# Patient Record
Sex: MALE | Race: WHITE | NOT HISPANIC OR LATINO | Employment: FULL TIME | URBAN - METROPOLITAN AREA
[De-identification: names, ages, dates, MRNs, and addresses within clinical notes are randomized per-mention and may not be internally consistent; named-entity substitution may affect disease eponyms.]

---

## 2018-05-02 ENCOUNTER — OFFICE VISIT (OUTPATIENT)
Dept: FAMILY MEDICINE | Facility: CLINIC | Age: 48
End: 2018-05-02
Payer: COMMERCIAL

## 2018-05-02 VITALS
HEIGHT: 71 IN | WEIGHT: 160.1 LBS | TEMPERATURE: 98.1 F | BODY MASS INDEX: 22.41 KG/M2 | DIASTOLIC BLOOD PRESSURE: 70 MMHG | OXYGEN SATURATION: 99 % | RESPIRATION RATE: 16 BRPM | HEART RATE: 75 BPM | SYSTOLIC BLOOD PRESSURE: 112 MMHG

## 2018-05-02 DIAGNOSIS — Z12.5 SCREENING FOR PROSTATE CANCER: ICD-10-CM

## 2018-05-02 DIAGNOSIS — Z00.00 ENCOUNTER FOR ROUTINE ADULT HEALTH EXAMINATION WITHOUT ABNORMAL FINDINGS: Primary | ICD-10-CM

## 2018-05-02 DIAGNOSIS — E78.5 HYPERLIPIDEMIA LDL GOAL <160: ICD-10-CM

## 2018-05-02 DIAGNOSIS — Z80.42 FAMILY HISTORY OF MALIGNANT NEOPLASM OF PROSTATE: ICD-10-CM

## 2018-05-02 DIAGNOSIS — L21.9 SEBORRHEIC DERMATITIS: ICD-10-CM

## 2018-05-02 DIAGNOSIS — Z12.11 COLON CANCER SCREENING: ICD-10-CM

## 2018-05-02 DIAGNOSIS — Z11.3 SCREEN FOR STD (SEXUALLY TRANSMITTED DISEASE): ICD-10-CM

## 2018-05-02 DIAGNOSIS — Z23 NEED FOR TDAP VACCINATION: ICD-10-CM

## 2018-05-02 DIAGNOSIS — B00.9 HERPES SIMPLEX VIRUS (HSV) INFECTION: ICD-10-CM

## 2018-05-02 DIAGNOSIS — G47.09 OTHER INSOMNIA: ICD-10-CM

## 2018-05-02 LAB
CHOLEST SERPL-MCNC: 229 MG/DL
GLUCOSE SERPL-MCNC: 87 MG/DL (ref 70–99)
HDLC SERPL-MCNC: 75 MG/DL
HIV 1+2 AB+HIV1 P24 AG SERPL QL IA: NONREACTIVE
LDLC SERPL CALC-MCNC: 144 MG/DL
NONHDLC SERPL-MCNC: 154 MG/DL
PSA SERPL-ACNC: 1.06 UG/L (ref 0–4)
TRIGL SERPL-MCNC: 48 MG/DL

## 2018-05-02 PROCEDURE — 36415 COLL VENOUS BLD VENIPUNCTURE: CPT | Performed by: FAMILY MEDICINE

## 2018-05-02 PROCEDURE — 90715 TDAP VACCINE 7 YRS/> IM: CPT | Performed by: FAMILY MEDICINE

## 2018-05-02 PROCEDURE — 87591 N.GONORRHOEAE DNA AMP PROB: CPT | Performed by: FAMILY MEDICINE

## 2018-05-02 PROCEDURE — 80061 LIPID PANEL: CPT | Performed by: FAMILY MEDICINE

## 2018-05-02 PROCEDURE — 86780 TREPONEMA PALLIDUM: CPT | Performed by: FAMILY MEDICINE

## 2018-05-02 PROCEDURE — 87491 CHLMYD TRACH DNA AMP PROBE: CPT | Performed by: FAMILY MEDICINE

## 2018-05-02 PROCEDURE — 87389 HIV-1 AG W/HIV-1&-2 AB AG IA: CPT | Performed by: FAMILY MEDICINE

## 2018-05-02 PROCEDURE — 99396 PREV VISIT EST AGE 40-64: CPT | Mod: 25 | Performed by: FAMILY MEDICINE

## 2018-05-02 PROCEDURE — 90471 IMMUNIZATION ADMIN: CPT | Performed by: FAMILY MEDICINE

## 2018-05-02 PROCEDURE — 82947 ASSAY GLUCOSE BLOOD QUANT: CPT | Performed by: FAMILY MEDICINE

## 2018-05-02 PROCEDURE — G0103 PSA SCREENING: HCPCS | Performed by: FAMILY MEDICINE

## 2018-05-02 NOTE — MR AVS SNAPSHOT
After Visit Summary   5/2/2018    Reji Lamb    MRN: [id number]           Patient Information     Date Of Birth          1970        Visit Information        Provider Department      5/2/2018 10:00 AM Veronica Sampson MD [facility]        Today's Diagnoses     Encounter for routine adult health examination without abnormal findings    -  1    Colon cancer screening        Need for Tdap vaccination        Hyperlipidemia LDL goal <160        Herpes simplex virus (HSV) infection        Other insomnia        Screen for STD (sexually transmitted disease)        Seborrheic dermatitis        Family history of malignant neoplasm of prostate        Screening for prostate cancer          Care Instructions      Preventive Health Recommendations  Male Ages 50 - 64    Yearly exam:             See your health care provider every year in order to  o   Review health changes.   o   Discuss preventive care.    o   Review your medicines if your doctor has prescribed any.     Have a cholesterol test every 5 years, or more frequently if you are at risk for high cholesterol/heart disease.     Have a diabetes test (fasting glucose) every three years. If you are at risk for diabetes, you should have this test more often.     Have a colonoscopy at age 50, or have a yearly FIT test (stool test). These exams will check for colon cancer.      Talk with your health care provider about whether or not a prostate cancer screening test (PSA) is right for you.    You should be tested each year for STDs (sexually transmitted diseases), if you re at risk.     Shots: Get a flu shot each year. Get a tetanus shot every 10 years.     Nutrition:    Eat at least 5 servings of fruits and vegetables daily.     Eat whole-grain bread, whole-wheat pasta and brown rice instead of white grains and rice.     Talk to your provider about Calcium and Vitamin D.     Lifestyle    Exercise for at least 150 minutes a week (30  minutes a day, 5 days a week). This will help you control your weight and prevent disease.     Limit alcohol to one drink per day.     No smoking.     Wear sunscreen to prevent skin cancer.     See your dentist every six months for an exam and cleaning.     See your eye doctor every 1 to 2 years.            Follow-ups after your visit        Additional Services     GASTROENTEROLOGY ADULT REF PROCEDURE ONLY Olga Weathers [phone]; Dr. MARYLOU Hooker       Last Lab Result: Creatinine (mg/dL)       Date                     Value                 11/18/2008               0.86             ----------  Body mass index is 22.33 kg/(m^2).      Patient will be contacted to schedule procedure.     Please be aware that coverage of these services is subject to the terms and limitations of your health insurance plan.  Call member services at your health plan with any benefit or coverage questions.  Any procedures must be performed at a [facility] facility OR coordinated by your clinic's referral office.    Please bring the following with you to your appointment:    (1) Any X-Rays, CTs or MRIs which have been performed.  Contact the facility where they were done to arrange for  prior to your scheduled appointment.    (2) List of current medications   (3) This referral request   (4) Any documents/labs given to you for this referral                  Who to contact     If you have questions or need follow up information about today's clinic visit or your schedule please contact [facility] directly at [phone].  Normal or non-critical lab and imaging results will be communicated to you by MyChart, letter or phone within 4 business days after the clinic has received the results. If you do not hear from us within 7 days, please contact the clinic through MyChart or phone. If you have a critical or abnormal lab result, we will notify you by phone as soon as possible.  Submit refill requests through [employer] or  "call your pharmacy and they will forward the refill request to us. Please allow 3 business days for your refill to be completed.          Additional Information About Your Visit        sarvaMAILhart Information     [employer] gives you secure access to your electronic health record. If you see a primary care provider, you can also send messages to your care team and make appointments. If you have questions, please call your primary care clinic.  If you do not have a primary care provider, please call [phone] and they will assist you.        Care EveryWhere ID     This is your Care EveryWhere ID. This could be used by other organizations to access your [facility] medical records  [id number]        Your Vitals Were     Pulse Temperature Respirations Height Pulse Oximetry BMI (Body Mass Index)    75 98.1  F (36.7  C) (Oral) 16 5' 11\" (1.803 m) 99% 22.33 kg/m2       Blood Pressure from Last 3 Encounters:   05/02/18 112/70   05/11/16 110/65   05/20/15 116/72    Weight from Last 3 Encounters:   05/02/18 160 lb 1.6 oz (72.6 kg)   05/11/16 151 lb (68.5 kg)   05/20/15 151 lb 9.6 oz (68.8 kg)              We Performed the Following     CHLAMYDIA TRACHOMATIS PCR     GASTROENTEROLOGY ADULT REF PROCEDURE ONLY Olga Weathers [phone]; Dr. MARYLOU Hooker     Glucose     HIV Antigen Antibody Combo     Lipid panel reflex to direct LDL Fasting     NEISSERIA GONORRHOEA PCR     PSA, screen     TDAP VACCINE (ADACEL)     Treponema Abs w Reflex to RPR and Titer        Primary Care Provider Office Phone # Fax #    Veronica Sampson [phone] [address] [facility] [address] [address]        Equal Access to Services     [facility] : Hadii lincoln Gibbs, waaxda luqadaha, qaybta kaalmada bertram, jassi patel. So [facility] [phone].    ATENCIÓN: Si habla español, tiene a merlos disposición servicios gratuitos de asistencia lingüística. Llame al [phone].    We " comply with applicable federal civil rights laws and [place] laws. We do not discriminate on the basis of race, color, national origin, age, disability, sex, sexual orientation, or gender identity.            Thank you!     Thank you for choosing [facility]  for your care. Our goal is always to provide you with excellent care. Hearing back from our patients is one way we can continue to improve our services. Please take a few minutes to complete the written survey that you may receive in the mail after your visit with us. Thank you!             Your Updated Medication List - Protect others around you: Learn how to safely use, store and throw away your medicines at www.disposemymeds.org.          This list is accurate as of 5/2/18 10:55 AM.  Always use your most recent med list.                   Brand Name Dispense Instructions for use Diagnosis    traZODone 50 MG tablet    DESYREL    40 tablet    Take 1 tablet (50 mg) by mouth At Bedtime Titrate to best dose - from 50mg to 100mg per night.    Other insomnia       triamcinolone 0.1 % ointment    KENALOG    15 g    Apply sparingly to affected area two times daily as needed    Seborrheic dermatitis       valACYclovir 1000 mg tablet    VALTREX    16 tablet    Take 2 tablets (2,000 mg) by mouth 2 times daily    Herpes simplex without mention of complication       vitamin D 1000 units capsule      Take 1 capsule by mouth daily

## 2018-05-02 NOTE — NURSING NOTE
"Chief Complaint   Patient presents with     Physical     pt is fasting     /70  Pulse 75  Temp 98.1  F (36.7  C) (Oral)  Resp 16  Ht 5' 11\" (1.803 m)  Wt 160 lb 1.6 oz (72.6 kg)  SpO2 99%  BMI 22.33 kg/m2 Estimated body mass index is 22.33 kg/(m^2) as calculated from the following:    Height as of this encounter: 5' 11\" (1.803 m).    Weight as of this encounter: 160 lb 1.6 oz (72.6 kg).  Medication Reconciliation: complete      Health Maintenance due pending provider review:  Colonoscopy/FIT    MAMMO/COLON--Gave pt phone number, and pended order for Mammo and/or Colonoscopy    Jocelynn Dudley CMA  "

## 2018-05-02 NOTE — PROGRESS NOTES
SUBJECTIVE:   CC: Reji Lamb is an 48 year old male who presents for preventative health visit.     Physical   Annual:     Getting at least 3 servings of Calcium per day::  Yes    Bi-annual eye exam::  Yes    Dental care twice a year::  Yes    Sleep apnea or symptoms of sleep apnea::  None    Diet::  Regular (no restrictions)    Frequency of exercise::  6-7 days/week    Duration of exercise::  45-60 minutes    Taking medications regularly::  Yes    Medication side effects::  Not applicable    Additional concerns today::  No            Father  ~1 1/2 yrs ago- shortly after moving him up to [employer].  Night prior, wasn't feeling that great.    Brother and his three girls the night prior- picnic on his bed.  [place] was great.    Mother is down in [place], she's doing pretty well.    Insomnia- was able to stop the trazodone, and is doing well.  Freelancing for now, now looking for a new position.  Using unisom about once a month.  Rec trying melatonin for those nights.    Seb dermatitis- using coconut oil working well.    Herpes cold sores- no outbreaks in ~3 yrs, has a few tabs left at home if needed to start course.        Today's PHQ-2 Score:   PHQ-2 (  [employer]) 2018   Q1: Little interest or pleasure in doing things 0   Q2: Feeling down, depressed or hopeless 0   PHQ-2 Score 0   Q1: Little interest or pleasure in doing things Not at all   Q2: Feeling down, depressed or hopeless Not at all   PHQ-2 Score 0       Abuse: Current or Past(Physical, Sexual or Emotional)- NO  Do you feel safe in your environment - YES    Social History   Substance Use Topics     Smoking status: Never Smoker     Smokeless tobacco: Never Used     Alcohol use Yes      Comment: ~6 drinks/wk     Alcohol Use 2018   If you drink alcohol do you typically have greater than 3 drinks per day OR greater than 7 drinks per week? No   No flowsheet data found.    Last PSA: No results found for: PSA    Reviewed orders with  patient. Reviewed health maintenance and updated orders accordingly - Yes  Labs reviewed in EPIC  BP Readings from Last 3 Encounters:   05/02/18 112/70   05/11/16 110/65   05/20/15 116/72    Wt Readings from Last 3 Encounters:   05/02/18 160 lb 1.6 oz (72.6 kg)   05/11/16 151 lb (68.5 kg)   05/20/15 151 lb 9.6 oz (68.8 kg)                  Patient Active Problem List   Diagnosis     Family history of malignant neoplasm of prostate     HYPERLIPIDEMIA LDL GOAL <160     Herpes simplex virus (HSV) infection     Insomnia     Bilateral low back pain with sciatica, sciatica laterality unspecified     Past Surgical History:   Procedure Laterality Date     C NONSPECIFIC PROCEDURE  Age 3    Cystectomy from eyelid     HERNIA REPAIR, INGUINAL RT/LT  2004    bilateral R>L       Social History   Substance Use Topics     Smoking status: Never Smoker     Smokeless tobacco: Never Used     Alcohol use Yes      Comment: ~6 drinks/wk     Family History   Problem Relation Age of Onset     Hypertension Mother      Breast Cancer Mother 71     lumpectomy/radiation, dx at 71, another lumpectomy at 75     Prostate Cancer Father      Dx'd age 55-60 approx, did well after txt, no issues     CANCER Father 48     esophageal cancer, partner smoked, some etoh     Allergies Sister      Lipids Brother      on meds     Circulatory Brother      DVT on coumadin     C.A.D. No family hx of      CEREBROVASCULAR DISEASE No family hx of          Current Outpatient Prescriptions   Medication Sig Dispense Refill     Cholecalciferol (VITAMIN D) 1000 UNITS capsule Take 1 capsule by mouth daily       traZODone (DESYREL) 50 MG tablet Take 1 tablet (50 mg) by mouth At Bedtime Titrate to best dose - from 50mg to 100mg per night. (Patient not taking: Reported on 5/2/2018) 40 tablet 2     triamcinolone (KENALOG) 0.1 % ointment Apply sparingly to affected area two times daily as needed (Patient not taking: Reported on 5/2/2018) 15 g 1     valACYclovir (VALTREX) 1000  "mg tablet Take 2 tablets (2,000 mg) by mouth 2 times daily 16 tablet 1     Allergies   Allergen Reactions     Amoxicillin      Turned Red     Seasonal Allergies      Recent Labs   Lab Test  05/02/18   1128  05/11/16   0825  12/10/13   0811   04/21/11   0819   LDL  144*  114*  109   < >  147*   HDL  75  74  73   < >  56   TRIG  48  68  103   < >  62   ALT   --    --    --    --   24    < > = values in this interval not displayed.        Reviewed and updated as needed this visit by clinical staff  Tobacco  Allergies  Meds  Med Hx  Surg Hx  Fam Hx  Soc Hx        Reviewed and updated as needed this visit by Provider            Review of Systems  10 point ROS of systems including Constitutional, Eyes, Respiratory, Cardiovascular, Gastroenterology, Genitourinary, Integumentary, Muscularskeletal, Psychiatric were all negative except for pertinent positives noted in my HPI.      OBJECTIVE:   /70  Pulse 75  Temp 98.1  F (36.7  C) (Oral)  Resp 16  Ht 5' 11\" (1.803 m)  Wt 160 lb 1.6 oz (72.6 kg)  SpO2 99%  BMI 22.33 kg/m2    Physical Exam  GENERAL: healthy, alert and no distress  EYES: Eyes grossly normal to inspection, PERRL and conjunctivae and sclerae normal  HENT: ear canals and TM's normal, nose and mouth without ulcers or lesions  NECK: no adenopathy, no asymmetry, masses, or scars and thyroid normal to palpation  RESP: lungs clear to auscultation - no rales, rhonchi or wheezes  CV: regular rate and rhythm, normal S1 S2, no S3 or S4, no murmur, click or rub, no peripheral edema and peripheral pulses strong  ABDOMEN: soft, nontender, no hepatosplenomegaly, no masses and bowel sounds normal  MS: no gross musculoskeletal defects noted, no edema  SKIN: no suspicious lesions or rashes  NEURO: Normal strength and tone, mentation intact and speech normal  PSYCH: mentation appears normal, affect normal/bright    ASSESSMENT/PLAN:       ICD-10-CM    1. Encounter for routine adult health examination without " "abnormal findings Z00.00 Glucose   2. Seborrheic dermatitis L21.9    3. Colon cancer screening Z12.11 GASTROENTEROLOGY ADULT REF PROCEDURE ONLY Olga Weathers [phone]; Dr. MARYLOU Hooker   4. Need for Tdap vaccination Z23 TDAP VACCINE (ADACEL)     VACCINE ADMINISTRATION, INITIAL   5. Hyperlipidemia LDL goal <160 E78.5 Lipid panel reflex to direct LDL Fasting   6. Herpes simplex virus (HSV) infection B00.9    7. Other insomnia G47.09    8. Screen for STD (sexually transmitted disease) Z11.3 NEISSERIA GONORRHOEA PCR     CHLAMYDIA TRACHOMATIS PCR     HIV Antigen Antibody Combo     Treponema Abs w Reflex to RPR and Titer   9. Family history of malignant neoplasm of prostate Z80.42    10. Screening for prostate cancer Z12.5 PSA, screen     CPE- Discussed diet, calcium and exercise.  Eyes and Teeth or UTD or recommended f/u.  Tdap immunizations needed today- Risks/benefits discussed, given today.  See orders below for tests ordered and screening needed.  Turned 48, will send for colonoscopy and discussed shingrix vaccination.    Will check fasting lipids, glucose, STD screen and PSA (discussed pros/cons/risks of testing).  Cont coconut oil for seborrheic dermatitis.      COUNSELING:   Reviewed preventive health counseling, as reflected in patient instructions         reports that he has never smoked. He has never used smokeless tobacco.    Estimated body mass index is 21.06 kg/(m^2) as calculated from the following:    Height as of 5/11/16: 5' 11\" (1.803 m).    Weight as of 5/11/16: 151 lb (68.5 kg).       Counseling Resources:  ATP IV Guidelines  Pooled Cohorts Equation Calculator  FRAX Risk Assessment  ICSI Preventive Guidelines  Dietary Guidelines for Americans, 2010  USDA's MyPlate  ASA Prophylaxis  Lung CA Screening    Veronica Sampson MD  [facility]  "

## 2018-05-03 LAB
C TRACH DNA SPEC QL NAA+PROBE: NEGATIVE
N GONORRHOEA DNA SPEC QL NAA+PROBE: NEGATIVE
SPECIMEN SOURCE: NORMAL
SPECIMEN SOURCE: NORMAL
T PALLIDUM AB SER QL: NONREACTIVE

## 2018-05-07 NOTE — PROGRESS NOTES
Here are your lab results from your recent visit...  -Your STD labs (gonorrhea, chlamydia, HIV and syphilis) were all negative.  -Your PSA level is very low which is good to see.    -Your glucose is in the normal range at 87 (<100 is normal), so there is no sign of diabetes or pre-diabetes.   -Your cholesterol panel looks slightly abnormal with a bit higher LDL again (the bad cholesterol), though your HDL (the good cholesterol) is still high and in a good range.   We should continue to check this yearly.    Please let me know if you have any questions.  Best,   Hugh Sampson MD

## 2018-07-19 ENCOUNTER — SURGERY (OUTPATIENT)
Age: 48
End: 2018-07-19

## 2018-07-19 ENCOUNTER — HOSPITAL ENCOUNTER (OUTPATIENT)
Facility: CLINIC | Age: 48
Discharge: HOME | End: 2018-07-19
Attending: SPECIALIST | Admitting: SPECIALIST
Payer: COMMERCIAL

## 2018-07-19 VITALS
SYSTOLIC BLOOD PRESSURE: 110 MMHG | WEIGHT: 150 LBS | BODY MASS INDEX: 21 KG/M2 | HEIGHT: 71 IN | DIASTOLIC BLOOD PRESSURE: 70 MMHG | OXYGEN SATURATION: 95 % | RESPIRATION RATE: 12 BRPM

## 2018-07-19 LAB — COLONOSCOPY: NORMAL

## 2018-07-19 PROCEDURE — G0500 MOD SEDAT ENDO SERVICE >5YRS: HCPCS | Performed by: SPECIALIST

## 2018-07-19 PROCEDURE — 45378 DIAGNOSTIC COLONOSCOPY: CPT | Performed by: SPECIALIST

## 2018-07-19 PROCEDURE — G0121 COLON CA SCRN NOT HI RSK IND: HCPCS | Performed by: SPECIALIST

## 2018-07-19 PROCEDURE — 99153 MOD SED SAME PHYS/QHP EA: CPT | Performed by: SPECIALIST

## 2018-07-19 PROCEDURE — 25000128 H RX IP 250 OP 636: Performed by: SPECIALIST

## 2018-07-19 RX ORDER — FENTANYL CITRATE 50 UG/ML
INJECTION, SOLUTION INTRAMUSCULAR; INTRAVENOUS PRN
Status: DISCONTINUED | OUTPATIENT
Start: 2018-07-19 | End: 2018-07-19 | Stop reason: HOSPADM

## 2018-07-19 RX ORDER — ONDANSETRON 2 MG/ML
4 INJECTION INTRAMUSCULAR; INTRAVENOUS
Status: DISCONTINUED | OUTPATIENT
Start: 2018-07-19 | End: 2018-07-19 | Stop reason: HOSPADM

## 2018-07-19 RX ORDER — LIDOCAINE 40 MG/G
CREAM TOPICAL
Status: DISCONTINUED | OUTPATIENT
Start: 2018-07-19 | End: 2018-07-19 | Stop reason: HOSPADM

## 2018-07-19 RX ADMIN — FENTANYL CITRATE 50 MCG: 50 INJECTION, SOLUTION INTRAMUSCULAR; INTRAVENOUS at 07:44

## 2018-07-19 RX ADMIN — FENTANYL CITRATE 100 MCG: 50 INJECTION, SOLUTION INTRAMUSCULAR; INTRAVENOUS at 07:35

## 2018-07-19 RX ADMIN — MIDAZOLAM 1 MG: 1 INJECTION INTRAMUSCULAR; INTRAVENOUS at 07:44

## 2018-07-19 RX ADMIN — FENTANYL CITRATE 25 MCG: 50 INJECTION, SOLUTION INTRAMUSCULAR; INTRAVENOUS at 07:54

## 2018-07-19 RX ADMIN — MIDAZOLAM 0.5 MG: 1 INJECTION INTRAMUSCULAR; INTRAVENOUS at 07:54

## 2018-07-19 RX ADMIN — MIDAZOLAM 2 MG: 1 INJECTION INTRAMUSCULAR; INTRAVENOUS at 07:36

## 2018-07-19 NOTE — BRIEF OP NOTE
[facility] Brief Operative Note    Pre-operative diagnosis: screening   Post-operative diagnosis Redundant colon     Procedure: Procedure(s):  colonoscopy - Wound Class: II-Clean Contaminated   Surgeon(s): Surgeon(s) and Role:     * Sha Hooker MD - Primary   Estimated blood loss: * No values recorded between 7/19/2018 12:00 AM and 7/19/2018  8:17 AM *    Specimens: * No specimens in log *   Findings: Please see ProVation procedure note in Chart Review

## 2018-07-19 NOTE — H&P
Pre-Endoscopy History and Physical     Reji Lamb MRN# [id number]   YOB: 1970 Age: 48 year old     Date of Procedure: 7/19/2018  Primary care provider: Veronica Sampson  Type of Endoscopy: Colonoscopy with possible biopsy, possible polypectomy  Reason for Procedure: screening  Type of Anesthesia Anticipated: Conscious Sedation    HPI:    Reji is a 48 year old male who will be undergoing the above procedure.      A history and physical has been performed. The patient's medications and allergies have been reviewed. The risks and benefits of the procedure and the sedation options and risks were discussed with the patient.  All questions were answered and informed consent was obtained.      He denies a personal or family history of anesthesia complications or bleeding disorders.     Patient Active Problem List   Diagnosis     Family history of malignant neoplasm of prostate     HYPERLIPIDEMIA LDL GOAL <160     Herpes simplex virus (HSV) infection     Insomnia     Bilateral low back pain with sciatica, sciatica laterality unspecified        Past Medical History:   Diagnosis Date     Herpes simplex without mention of complication     cold sores, daily (bad outbreaks 3x/yr prior to qd meds)     Hyperlipidemia LDL goal <160 10/31/2010    high HDL     Insomnia 2011 4/11 trazodone, working well prn        Past Surgical History:   Procedure Laterality Date     C NONSPECIFIC PROCEDURE  Age 3    Cystectomy from eyelid     HERNIA REPAIR, INGUINAL RT/LT  2004    bilateral R>L       Social History   Substance Use Topics     Smoking status: Never Smoker     Smokeless tobacco: Never Used     Alcohol use Yes      Comment: ~6 drinks/wk       Family History   Problem Relation Age of Onset     Hypertension Mother      Breast Cancer Mother 71     lumpectomy/radiation, dx at 71, another lumpectomy at 75     Prostate Cancer Father      Dx'd age 55-60 approx, did well after txt, no issues     Cancer Father 48      "esophageal cancer, partner smoked, some etoh     Allergies Sister      Lipids Brother      on meds     Circulatory Brother      DVT on coumadin     C.A.D. No family hx of      Cerebrovascular Disease No family hx of        Prior to Admission medications    Medication Sig Start Date End Date Taking? Authorizing Provider   Cholecalciferol (VITAMIN D) 1000 UNITS capsule Take 1 capsule by mouth daily   Yes Reported, Patient   MELATONIN PO Take 3 mg by mouth nightly as needed   Yes Reported, Patient   traZODone (DESYREL) 50 MG tablet Take 1 tablet (50 mg) by mouth At Bedtime Titrate to best dose - from 50mg to 100mg per night. 5/11/16  Yes Veronica Sampson MD   triamcinolone (KENALOG) 0.1 % ointment Apply sparingly to affected area two times daily as needed 5/11/16  Yes Veronica Sampson MD   valACYclovir (VALTREX) 1000 mg tablet Take 2 tablets (2,000 mg) by mouth 2 times daily 5/20/15   Veroinca Sampson MD       Allergies   Allergen Reactions     Amoxicillin      Turned Red     Seasonal Allergies         REVIEW OF SYSTEMS:   5 point ROS negative except as noted above in HPI, including Gen., Resp., CV, GI &  system review.    PHYSICAL EXAM:   /77  Resp 12  Ht 1.803 m (5' 11\")  Wt 68 kg (150 lb)  SpO2 99%  BMI 20.92 kg/m2 Estimated body mass index is 20.92 kg/(m^2) as calculated from the following:    Height as of this encounter: 1.803 m (5' 11\").    Weight as of this encounter: 68 kg (150 lb).   GENERAL APPEARANCE: alert, and oriented  MENTAL STATUS: alert  AIRWAY EXAM: Mallampatti Class I (visualization of the soft palate, fauces, uvula, anterior and posterior pillars)  RESP: lungs clear to auscultation - no rales, rhonchi or wheezes  CV: regular rates and rhythm  DIAGNOSTICS:    Not indicated    IMPRESSION   ASA Class 1 - Healthy patient, no medical problems    PLAN:   Plan for Colonoscopy with possible biopsy, possible polypectomy. We discussed the risks, benefits and alternatives and " the patient wished to proceed.    The above has been forwarded to the consulting provider.      Signed Electronically by: Sha Hooker  July 19, 2018

## 2018-08-14 ENCOUNTER — MYC MEDICAL ADVICE (OUTPATIENT)
Dept: FAMILY MEDICINE | Facility: CLINIC | Age: 48
End: 2018-08-14

## 2018-08-15 ENCOUNTER — MYC MEDICAL ADVICE (OUTPATIENT)
Dept: FAMILY MEDICINE | Facility: CLINIC | Age: 48
End: 2018-08-15

## 2018-08-15 NOTE — TELEPHONE ENCOUNTER
Received form(s) from pt for biometric.  Placed form(s) in/on CW's box.  Forms need to be filled out and signed and faxed to [phone]..    Call pt to verify form was sent: [employer]  Copy needs to be sent for scanning after completion: Yes    Jocelynn Dudley CMA

## 2019-09-20 ENCOUNTER — OFFICE VISIT (OUTPATIENT)
Dept: FAMILY MEDICINE | Facility: CLINIC | Age: 49
End: 2019-09-20
Payer: COMMERCIAL

## 2019-09-20 VITALS
OXYGEN SATURATION: 100 % | DIASTOLIC BLOOD PRESSURE: 79 MMHG | HEIGHT: 71 IN | WEIGHT: 157.4 LBS | BODY MASS INDEX: 22.04 KG/M2 | RESPIRATION RATE: 16 BRPM | HEART RATE: 64 BPM | SYSTOLIC BLOOD PRESSURE: 128 MMHG | TEMPERATURE: 98.5 F

## 2019-09-20 DIAGNOSIS — Z23 FLU VACCINE NEED: ICD-10-CM

## 2019-09-20 DIAGNOSIS — E78.5 HYPERLIPIDEMIA LDL GOAL <160: ICD-10-CM

## 2019-09-20 DIAGNOSIS — Z80.42 FAMILY HISTORY OF MALIGNANT NEOPLASM OF PROSTATE: ICD-10-CM

## 2019-09-20 DIAGNOSIS — B00.9 HERPES SIMPLEX VIRUS (HSV) INFECTION: ICD-10-CM

## 2019-09-20 DIAGNOSIS — Z00.00 ROUTINE GENERAL MEDICAL EXAMINATION AT A HEALTH CARE FACILITY: Primary | ICD-10-CM

## 2019-09-20 LAB
CHOLEST SERPL-MCNC: 216 MG/DL
GLUCOSE SERPL-MCNC: 81 MG/DL (ref 70–99)
HDLC SERPL-MCNC: 68 MG/DL
LDLC SERPL CALC-MCNC: 132 MG/DL
NONHDLC SERPL-MCNC: 148 MG/DL
PSA SERPL-ACNC: 0.97 UG/L (ref 0–4)
TRIGL SERPL-MCNC: 82 MG/DL

## 2019-09-20 PROCEDURE — 82947 ASSAY GLUCOSE BLOOD QUANT: CPT | Performed by: FAMILY MEDICINE

## 2019-09-20 PROCEDURE — 90471 IMMUNIZATION ADMIN: CPT | Performed by: FAMILY MEDICINE

## 2019-09-20 PROCEDURE — 99396 PREV VISIT EST AGE 40-64: CPT | Mod: 25 | Performed by: FAMILY MEDICINE

## 2019-09-20 PROCEDURE — 90686 IIV4 VACC NO PRSV 0.5 ML IM: CPT | Performed by: FAMILY MEDICINE

## 2019-09-20 PROCEDURE — 36415 COLL VENOUS BLD VENIPUNCTURE: CPT | Performed by: FAMILY MEDICINE

## 2019-09-20 PROCEDURE — G0103 PSA SCREENING: HCPCS | Performed by: FAMILY MEDICINE

## 2019-09-20 PROCEDURE — 80061 LIPID PANEL: CPT | Performed by: FAMILY MEDICINE

## 2019-09-20 RX ORDER — VALACYCLOVIR HYDROCHLORIDE 1 G/1
2000 TABLET, FILM COATED ORAL 2 TIMES DAILY
Qty: 16 TABLET | Refills: 1 | Status: SHIPPED | OUTPATIENT
Start: 2019-09-20 | End: 2020-10-28

## 2019-09-20 ASSESSMENT — MIFFLIN-ST. JEOR: SCORE: 1591.09

## 2019-09-20 NOTE — PROGRESS NOTES
SUBJECTIVE:   CC: Reji Lamb is an 49 year old male who presents for preventative health visit.     Healthy Habits:     Getting at least 3 servings of Calcium per day:  Yes    Bi-annual eye exam:  Yes    Dental care twice a year:  Yes    Sleep apnea or symptoms of sleep apnea:  None    Diet:  Regular (no restrictions)    Frequency of exercise:  6-7 days/week    Duration of exercise:  45-60 minutes    Taking medications regularly:  Yes    Medication side effects:  Not applicable    PHQ-2 Total Score: 0    Additional concerns today:  No    Doing well in general.    Has fit bit, gives him a baseline of what he sleeps, realized he's in bed x 8 yrs, but sleeping just 6.5 hrs.  Now trying to get to bed sooner.  Using the melatonin most nights, thinks 10mg/night.  Usually helpful, sometimes wakes up some, but not bad.  Hasn't been using the trazodone for awhile - not as much need with less travel.    LDL tends to correlate with his weight.  It was as high as 172 when wt was up to 163 lbs in 10/12, then got it down to 109 in 12/13 with really working on diet/exercise (wt down to 150 lbs).  Wt back up to 160 lbs last year in 5/18, and LDL up to 144.  Now wt back down to 150 lbs again, where he feels pretty good.  He pushed this physical out some, had gained wt this past spring with vacation, eating more.  Now eating more like he normally does.  Working out a lot.    Exercise- [employer] 3d/wk (for 1 1/2 yrs now), and makes sure he's walking or running.  Stopped the long runs outside.  Some biking.  Active most days- trying to moving 1hr/day.      Today's PHQ-2 Score:   PHQ-2 ( 1999 Pfizer) 9/19/2019   Q1: Little interest or pleasure in doing things 0   Q2: Feeling down, depressed or hopeless 0   PHQ-2 Score 0   Q1: Little interest or pleasure in doing things Not at all   Q2: Feeling down, depressed or hopeless Not at all   PHQ-2 Score 0       Abuse: Current or Past(Physical, Sexual or Emotional)- No  Do you feel safe  in your environment? Yes    Social History     Tobacco Use     Smoking status: Never Smoker     Smokeless tobacco: Never Used   Substance Use Topics     Alcohol use: Yes     Comment: ~6 drinks/wk     If you drink alcohol do you typically have >3 drinks per day or >7 drinks per week? No    Last PSA:   PSA   Date Value Ref Range Status   05/02/2018 1.06 0 - 4 ug/L Final     Comment:     Assay Method:  Chemiluminescence using Siemens Vista analyzer     Reviewed orders with patient. Reviewed health maintenance and updated orders accordingly - Yes      Lab work is in process  Labs reviewed in EPIC  BP Readings from Last 3 Encounters:   09/20/19 128/79   07/19/18 110/70   05/02/18 112/70    Wt Readings from Last 3 Encounters:   09/20/19 71.4 kg (157 lb 6.4 oz)   07/19/18 68 kg (150 lb)   05/02/18 72.6 kg (160 lb 1.6 oz)            Patient Active Problem List   Diagnosis     Family history of malignant neoplasm of prostate     HYPERLIPIDEMIA LDL GOAL <160     Herpes simplex virus (HSV) infection     Insomnia     Bilateral low back pain with sciatica, sciatica laterality unspecified     Past Surgical History:   Procedure Laterality Date     C NONSPECIFIC PROCEDURE  Age 3    Cystectomy from eyelid     COLONOSCOPY N/A 7/19/2018    Procedure: COLONOSCOPY;  colonoscopy;  Surgeon: Sha Hooker MD;  Location: [facility]     HERNIA REPAIR, INGUINAL RT/LT  2004    bilateral R>L       Social History     Tobacco Use     Smoking status: Never Smoker     Smokeless tobacco: Never Used   Substance Use Topics     Alcohol use: Yes     Comment: ~6 drinks/wk     Family History   Problem Relation Age of Onset     Hypertension Mother      Breast Cancer Mother 71        lumpectomy/radiation, dx at 71, another lumpectomy at 75     Prostate Cancer Father         Dx'd age 55-60 approx, did well after txt, no issues     Cancer Father 48        esophageal cancer, partner smoked, some etoh     Allergies Sister      Lipids Brother         on meds      "Circulatory Brother         DVT on coumadin     C.A.D. No family hx of      Cerebrovascular Disease No family hx of          Current Outpatient Medications   Medication Sig Dispense Refill     valACYclovir (VALTREX) 1000 mg tablet Take 2 tablets (2,000 mg) by mouth 2 times daily 16 tablet 1     Cholecalciferol (VITAMIN D) 1000 UNITS capsule Take 1 capsule by mouth daily       MELATONIN PO Take 3 mg by mouth nightly as needed       triamcinolone (KENALOG) 0.1 % ointment Apply sparingly to affected area two times daily as needed 15 g 1       Allergies   Allergen Reactions     Amoxicillin      Turned Red     Seasonal Allergies        Recent Labs   Lab Test 05/02/18  1128 05/11/16  0825 12/10/13  0811   * 114* 109   HDL 75 74 73   TRIG 48 68 103        Reviewed and updated as needed this visit by clinical staff  Tobacco  Allergies  Meds  Problems  Med Hx  Surg Hx  Fam Hx         Reviewed and updated as needed this visit by Provider  Tobacco  Allergies  Meds  Problems  Med Hx  Surg Hx  Fam Hx          Review of Systems  10 point ROS of systems including Constitutional, Eyes, Respiratory, Cardiovascular, Gastroenterology, Genitourinary, Integumentary, Muscularskeletal, Psychiatric were all negative except for pertinent positives noted in my HPI.    OBJECTIVE:   /79   Pulse 64   Temp 98.5  F (36.9  C) (Oral)   Resp 16   Ht 1.803 m (5' 11\")   Wt 71.4 kg (157 lb 6.4 oz)   SpO2 100%   BMI 21.95 kg/m    Physical Exam  GENERAL: healthy, alert and no distress  EYES: Eyes grossly normal to inspection, PERRL and conjunctivae and sclerae normal  HENT: ear canals and TM's normal, nose and mouth without ulcers or lesions  NECK: no adenopathy, no asymmetry, masses, or scars and thyroid normal to palpation  RESP: lungs clear to auscultation - no rales, rhonchi or wheezes  CV: regular rate and rhythm, normal S1 S2, no S3 or S4, no murmur, click or rub, no peripheral edema and peripheral pulses " "strong  ABDOMEN: soft, nontender, no hepatosplenomegaly, no masses and bowel sounds normal  MS: no gross musculoskeletal defects noted, no edema  SKIN: no suspicious lesions or rashes  NEURO: Normal strength and tone, mentation intact and speech normal  PSYCH: mentation appears normal, affect normal/bright      ASSESSMENT/PLAN:       ICD-10-CM    1. Routine general medical examination at a health care facility Z00.00 Glucose   2. Herpes simplex virus (HSV) infection B00.9 valACYclovir (VALTREX) 1000 mg tablet   3. Hyperlipidemia LDL goal <160 E78.5 Lipid panel reflex to direct LDL Fasting   4. Family history of malignant neoplasm of prostate Z80.42 PSA, screen   5. Flu vaccine need Z23 C RIV4 (FLUBLOK) VACCINE RECOMBINANT DNA PRSRV ANTIBIO FREE, IM [[id number]]     CPE- Discussed diet, calcium and exercise.  Eye and dental care UTD or recommended f/u.  Flu vaccine immunizations needed today.  See orders below for tests ordered and screening needed.      HSV- rare outbreaks, but taking valtrex with just a lip tingle seems to stop things right there.  Will send in a rx to have available as last one sent in '15.    Lipids- LDL ranges significantly with his wt/diet/exercise.  Has been as high as 172 and as low at 109- was 144 last year.  Wt is down 10 lbs to 150 lbs today, back where he likes it.  Has been back in good diet/exercise habits lately, would like to check again today.        COUNSELING:   Reviewed preventive health counseling, as reflected in patient instructions  Special attention given to:        Regular exercise       Healthy diet/nutrition       Colon cancer screening       Prostate cancer screening       Osteoporosis Prevention/Bone Health    Estimated body mass index is 21.95 kg/m  as calculated from the following:    Height as of this encounter: 1.803 m (5' 11\").    Weight as of this encounter: 71.4 kg (157 lb 6.4 oz).     Weight management plan: Discussed healthy diet and exercise guidelines     reports " that he has never smoked. He has never used smokeless tobacco.      Counseling Resources:  ATP IV Guidelines  Pooled Cohorts Equation Calculator  FRAX Risk Assessment  ICSI Preventive Guidelines  Dietary Guidelines for Americans, 2010  USDA's MyPlate  ASA Prophylaxis  Lung CA Screening    Veronica Sampson MD  [facility]

## 2019-09-23 NOTE — RESULT ENCOUNTER NOTE
Here are your lab results from your recent visit...  -Your glucose is in the normal range at 81 (<100 is normal), so there is no sign of diabetes or pre-diabetes.   -Your cholesterol panel looks better with a just slightly high LDL (the bad cholesterol- down from 144 last year to 132) and a good HDL (the good cholesterol).   -Your PSA continues to look very low which is good.    Please let me know if you have any questions.  Best,   Hugh Sampson MD

## 2019-11-06 ENCOUNTER — HEALTH MAINTENANCE LETTER (OUTPATIENT)
Age: 49
End: 2019-11-06

## 2020-10-28 ENCOUNTER — OFFICE VISIT (OUTPATIENT)
Dept: FAMILY MEDICINE | Facility: CLINIC | Age: 50
End: 2020-10-28
Payer: COMMERCIAL

## 2020-10-28 VITALS
SYSTOLIC BLOOD PRESSURE: 132 MMHG | HEIGHT: 71 IN | TEMPERATURE: 97.1 F | DIASTOLIC BLOOD PRESSURE: 81 MMHG | WEIGHT: 162.2 LBS | BODY MASS INDEX: 22.71 KG/M2 | OXYGEN SATURATION: 98 % | HEART RATE: 57 BPM

## 2020-10-28 DIAGNOSIS — Z00.00 ROUTINE GENERAL MEDICAL EXAMINATION AT A HEALTH CARE FACILITY: Primary | ICD-10-CM

## 2020-10-28 DIAGNOSIS — G47.09 OTHER INSOMNIA: ICD-10-CM

## 2020-10-28 DIAGNOSIS — Z12.5 SCREENING FOR PROSTATE CANCER: ICD-10-CM

## 2020-10-28 DIAGNOSIS — Z11.59 NEED FOR HEPATITIS C SCREENING TEST: ICD-10-CM

## 2020-10-28 DIAGNOSIS — E78.5 HYPERLIPIDEMIA LDL GOAL <160: ICD-10-CM

## 2020-10-28 DIAGNOSIS — Z80.42 FAMILY HISTORY OF MALIGNANT NEOPLASM OF PROSTATE: ICD-10-CM

## 2020-10-28 DIAGNOSIS — L21.9 SEBORRHEIC DERMATITIS: ICD-10-CM

## 2020-10-28 DIAGNOSIS — B00.9 HERPES SIMPLEX VIRUS (HSV) INFECTION: ICD-10-CM

## 2020-10-28 DIAGNOSIS — R03.0 ELEVATED BP WITHOUT DIAGNOSIS OF HYPERTENSION: ICD-10-CM

## 2020-10-28 PROCEDURE — 99396 PREV VISIT EST AGE 40-64: CPT | Performed by: FAMILY MEDICINE

## 2020-10-28 PROCEDURE — G0103 PSA SCREENING: HCPCS | Performed by: FAMILY MEDICINE

## 2020-10-28 PROCEDURE — 86803 HEPATITIS C AB TEST: CPT | Performed by: FAMILY MEDICINE

## 2020-10-28 PROCEDURE — 80048 BASIC METABOLIC PNL TOTAL CA: CPT | Performed by: FAMILY MEDICINE

## 2020-10-28 PROCEDURE — 80061 LIPID PANEL: CPT | Performed by: FAMILY MEDICINE

## 2020-10-28 RX ORDER — TRIAMCINOLONE ACETONIDE 1 MG/G
OINTMENT TOPICAL
Qty: 15 G | Refills: 1 | Status: SHIPPED | OUTPATIENT
Start: 2020-10-28 | End: 2021-12-31

## 2020-10-28 RX ORDER — VALACYCLOVIR HYDROCHLORIDE 1 G/1
2000 TABLET, FILM COATED ORAL 2 TIMES DAILY
Qty: 16 TABLET | Refills: 1 | Status: SHIPPED | OUTPATIENT
Start: 2020-10-28 | End: 2021-12-15

## 2020-10-28 ASSESSMENT — MIFFLIN-ST. JEOR: SCORE: 1601.51

## 2020-10-28 NOTE — PROGRESS NOTES
SUBJECTIVE:   CC: Reji Lamb is an 50 year old male who presents for preventative health visit.       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  Did get to bring everything home from the office- ergonomic set up.    Had been doing side job for .  It had been fun, but had to cancel a bunch of trips.    Had been going [employer], got lipids better.  3x/wk before- was really good.    Now, he's been walking a lot.  [employer]- started ~2 months ago.   [employer]- does a good job.  Gets there ~2x/wk.  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  [employer]- online work outs.  Good work out 6d/wk before, now good workout 4d/wk, and getting steps in.    No diet changes.  Less eating out, from ~1x/wk.    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  Saw eye doctor- told him to take fish oil.  Has done that for about a week, and has been getting better.    Uses aveeno for his face.  **Try cera-ve for chronic cares and hydrocortisone 2x/day for a few days, then stop.          Today's PHQ-2 Score:   PHQ-2 ( 1999 Pfizer) 10/27/2020   Q1: Little interest or pleasure in doing things 0   Q2: Feeling down, depressed or hopeless 0   PHQ-2 Score 0   Q1: Little interest or pleasure in doing things Not at all   Q2: Feeling down, depressed or hopeless Not at all    PHQ-2 Score 0       Abuse: Current or Past(Physical, Sexual or Emotional)- No  Do you feel safe in your environment? Yes        Social History     Tobacco Use     Smoking status: Never Smoker     Smokeless tobacco: Never Used   Substance Use Topics     Alcohol use: Yes     Comment: ~6 drinks/wk     If you drink alcohol do you typically have >3 drinks per day or >7 drinks per week? No    Alcohol Use 10/27/2020   Prescreen: >3 drinks/day or >7 drinks/week? No   Prescreen: >3 drinks/day or >7 drinks/week? -   No flowsheet data found.    Last PSA:   PSA   Date Value Ref Range Status   09/20/2019 0.97 0 - 4 ug/L Final     Comment:     Assay Method:  Chemiluminescence using Siemens Vista analyzer       Reviewed orders with patient. Reviewed health maintenance and updated orders accordingly - Yes    Lab work is in process  Labs reviewed in EPIC  BP Readings from Last 3 Encounters:   10/28/20 132/81   09/20/19 128/79   07/19/18 110/70    Wt Readings from Last 3 Encounters:   10/28/20 73.6 kg (162 lb 3.2 oz)   09/20/19 71.4 kg (157 lb 6.4 oz)   07/19/18 68 kg (150 lb)                  Patient Active Problem List   Diagnosis     Family history of malignant neoplasm of prostate     HYPERLIPIDEMIA LDL GOAL <160     Herpes simplex virus (HSV) infection     Insomnia     Bilateral low back pain with sciatica, sciatica laterality unspecified     Past Surgical History:   Procedure Laterality Date     COLONOSCOPY N/A 7/19/2018    Procedure: COLONOSCOPY;  colonoscopy;  Surgeon: Sha Hooker MD;  Location:  GI     HERNIA REPAIR, INGUINAL RT/LT  2004    bilateral R>L     ZZC NONSPECIFIC PROCEDURE  Age 3    Cystectomy from eyelid       Social History     Tobacco Use     Smoking status: Never Smoker     Smokeless tobacco: Never Used   Substance Use Topics     Alcohol use: Yes     Comment: ~6 drinks/wk     Family History   Problem Relation Age of Onset     Hypertension Mother      Breast Cancer Mother 71        lumpectomy/radiation,  "dx at 71, another lumpectomy at 75     Prostate Cancer Father         Dx'd age 55-60 approx, did well after txt, no issues     Cancer Father 48        esophageal cancer, partner smoked, some etoh     Allergies Sister      Lipids Brother         on meds     Circulatory Brother         DVT on coumadin     C.A.D. No family hx of      Cerebrovascular Disease No family hx of          Current Outpatient Medications   Medication Sig Dispense Refill     Omega-3 Fatty Acids (FISH OIL PO)        triamcinolone (KENALOG) 0.1 % external ointment Apply sparingly to affected area two times daily as needed 15 g 1     valACYclovir (VALTREX) 1000 mg tablet Take 2 tablets (2,000 mg) by mouth 2 times daily 16 tablet 1     Cholecalciferol (VITAMIN D) 1000 UNITS capsule Take 1 capsule by mouth daily       MELATONIN PO Take 3 mg by mouth nightly as needed       Allergies   Allergen Reactions     Amoxicillin      Turned Red     Seasonal Allergies      Recent Labs   Lab Test 09/20/19  0840 05/02/18  1128 05/11/16  0825   * 144* 114*   HDL 68 75 74   TRIG 82 48 68        Reviewed and updated as needed this visit by clinical staff                 Reviewed and updated as needed this visit by Provider                    Review of Systems  10 point ROS of systems including Constitutional, Eyes, Respiratory, Cardiovascular, Gastroenterology, Genitourinary, Integumentary, Muscularskeletal, Psychiatric were all negative except for pertinent positives noted in my HPI.      OBJECTIVE:   /81   Pulse 57   Temp 97.1  F (36.2  C) (Tympanic)   Ht 1.793 m (5' 10.6\")   Wt 73.6 kg (162 lb 3.2 oz)   SpO2 98%   BMI 22.88 kg/m      Physical Exam  GENERAL: healthy, alert and no distress  EYES: Eyes grossly normal to inspection, PERRL and conjunctivae and sclerae normal  HENT: ear canals and TM's normal, nose and mouth without ulcers or lesions  NECK: no adenopathy, no asymmetry, masses, or scars and thyroid normal to palpation  RESP: lungs " clear to auscultation - no rales, rhonchi or wheezes  CV: regular rate and rhythm, normal S1 S2, no S3 or S4, no murmur, click or rub, no peripheral edema and peripheral pulses strong  ABDOMEN: soft, nontender, no hepatosplenomegaly, no masses and bowel sounds normal  MS: no gross musculoskeletal defects noted, no edema  SKIN: no suspicious lesions or rashes  NEURO: Normal strength and tone, mentation intact and speech normal  PSYCH: mentation appears normal, affect normal/bright    Diagnostic Test Results:  Labs reviewed in Epic    ASSESSMENT/PLAN:       ICD-10-CM    1. Routine general medical examination at a health care facility  Z00.00 Basic metabolic panel  (Ca, Cl, CO2, Creat, Gluc, K, Na, BUN)   2. Other insomnia  G47.09    3. Hyperlipidemia LDL goal <160  E78.5 Lipid panel reflex to direct LDL Fasting   4. Herpes simplex virus (HSV) infection  B00.9 valACYclovir (VALTREX) 1000 mg tablet   5. Family history of malignant neoplasm of prostate  Z80.42    6. Screening for prostate cancer  Z12.5 PSA, screen   7. Need for hepatitis C screening test  Z11.59 **Hepatitis C Screen Reflex to RNA FUTURE anytime   8. Elevated BP without diagnosis of hypertension  R03.0 Basic metabolic panel  (Ca, Cl, CO2, Creat, Gluc, K, Na, BUN)   9. Seborrheic dermatitis  L21.9 triamcinolone (KENALOG) 0.1 % external ointment     CPE- Discussed diet, calcium and exercise.  Eye and dental care UTD or recommended f/u.  No immunizations needed today.  See orders below for tests ordered and screening needed.       Insomnia- cont rare melatonin use.    Hyperlipidemia- has been able to lower lipids with good exercise, but that's been less lately due to COVID - not comfortable going to [employer]. Was mostly walking, now found [employer] place that really limits people- max 8 there, need to make reservation.  Will recheck fasting lipids today and BMP.    Eczema- eyelid eczema.  Saw eye doctor- told him to take fish oil.  Has done that  "for about a week, and has been getting better.  Uses aveeno for his face.  Rec try to use cera-ve for chronic cares and hydrocortisone 2x/day for a few days, then stop.    Patient has been advised of split billing requirements and indicates understanding: Yes     COUNSELING:   Reviewed preventive health counseling, as reflected in patient instructions    Estimated body mass index is 21.95 kg/m  as calculated from the following:    Height as of 9/20/19: 1.803 m (5' 11\").    Weight as of 9/20/19: 71.4 kg (157 lb 6.4 oz).         He reports that he has never smoked. He has never used smokeless tobacco.      Counseling Resources:  ATP IV Guidelines  Pooled Cohorts Equation Calculator  FRAX Risk Assessment  ICSI Preventive Guidelines  Dietary Guidelines for Americans, 2010  USDA's MyPlate  ASA Prophylaxis  Lung CA Screening    Veronica Sampson MD  [facility]  "

## 2020-10-29 LAB
ANION GAP SERPL CALCULATED.3IONS-SCNC: 3 MMOL/L (ref 3–14)
BUN SERPL-MCNC: 11 MG/DL (ref 7–30)
CALCIUM SERPL-MCNC: 9 MG/DL (ref 8.5–10.1)
CHLORIDE SERPL-SCNC: 106 MMOL/L (ref 94–109)
CHOLEST SERPL-MCNC: 235 MG/DL
CO2 SERPL-SCNC: 29 MMOL/L (ref 20–32)
CREAT SERPL-MCNC: 0.73 MG/DL (ref 0.66–1.25)
GFR SERPL CREATININE-BSD FRML MDRD: >90 ML/MIN/{1.73_M2}
GLUCOSE SERPL-MCNC: 79 MG/DL (ref 70–99)
HCV AB SERPL QL IA: NONREACTIVE
HDLC SERPL-MCNC: 67 MG/DL
LDLC SERPL CALC-MCNC: 149 MG/DL
NONHDLC SERPL-MCNC: 168 MG/DL
POTASSIUM SERPL-SCNC: 4 MMOL/L (ref 3.4–5.3)
PSA SERPL-ACNC: 1.16 UG/L (ref 0–4)
SODIUM SERPL-SCNC: 138 MMOL/L (ref 133–144)
TRIGL SERPL-MCNC: 95 MG/DL

## 2020-10-29 NOTE — RESULT ENCOUNTER NOTE
Here are your lab results from your recent visit...  -Your PSA is still in a good/low range.  -Your basic metabolic panel (which includes electrolyte levels, blood sugar level and kidney function tests) looks good.  -Your hepatitis C antibody is negative.  We've been checking this as a one-time screening test for those born between 1945 and 1965 due to the slightly higher risk in this age group.  You should not need to be tested for this again.   -Your cholesterol panel looks a bit worse again with a bit higher LDL (the bad cholesterol, up from 132 to 149) but a stable/high HDL (the good cholesterol).     Please let me know if you have any questions.  Best,   Hugh Sampson MD

## 2021-04-03 ENCOUNTER — IMMUNIZATION (OUTPATIENT)
Dept: LAB | Facility: CLINIC | Age: 51
End: 2021-04-03
Payer: COMMERCIAL

## 2021-08-16 ENCOUNTER — OFFICE VISIT (OUTPATIENT)
Dept: URGENT CARE | Facility: URGENT CARE | Age: 51
End: 2021-08-16
Payer: COMMERCIAL

## 2021-08-16 VITALS
TEMPERATURE: 98.8 F | HEART RATE: 85 BPM | SYSTOLIC BLOOD PRESSURE: 124 MMHG | HEIGHT: 71 IN | WEIGHT: 155 LBS | DIASTOLIC BLOOD PRESSURE: 81 MMHG | BODY MASS INDEX: 21.7 KG/M2 | OXYGEN SATURATION: 97 %

## 2021-08-16 DIAGNOSIS — R07.0 THROAT PAIN: ICD-10-CM

## 2021-08-16 DIAGNOSIS — J02.0 STREP THROAT: Primary | ICD-10-CM

## 2021-08-16 LAB — DEPRECATED S PYO AG THROAT QL EIA: POSITIVE

## 2021-08-16 PROCEDURE — 87880 STREP A ASSAY W/OPTIC: CPT | Performed by: PHYSICIAN ASSISTANT

## 2021-08-16 PROCEDURE — 99213 OFFICE O/P EST LOW 20 MIN: CPT | Performed by: PHYSICIAN ASSISTANT

## 2021-08-16 RX ORDER — AZITHROMYCIN 250 MG/1
TABLET, FILM COATED ORAL
Qty: 6 TABLET | Refills: 0 | Status: SHIPPED | OUTPATIENT
Start: 2021-08-16 | End: 2021-08-21

## 2021-08-16 ASSESSMENT — MIFFLIN-ST. JEOR: SCORE: 1570.21

## 2021-08-16 NOTE — PROGRESS NOTES
Throat pain  - Streptococcus A Rapid Screen w/Reflex to PCR - Clinic Collect  - azithromycin (ZITHROMAX) 250 MG tablet; Take 2 tablets (500 mg) by mouth daily for 1 day, THEN 1 tablet (250 mg) daily for 4 days.    Strep throat  - azithromycin (ZITHROMAX) 250 MG tablet; Take 2 tablets (500 mg) by mouth daily for 1 day, THEN 1 tablet (250 mg) daily for 4 days.    20 minutes spent on the date of the encounter doing chart review, history and exam, documentation and further activities per the note     See Patient Instructions  Patient Instructions     Patient Education     Self-Care for Sore Throats     Sore throats happen for many reasons, such as colds, allergies, cigarette smoke, air pollution, and infections caused by viruses or bacteria. In any case, your throat becomes red and sore. Your goal for self-care is to reduce your discomfort while giving your throat a chance to heal.  Moisten and soothe your throat  Tips include the following:    Try a sip of water first thing after waking up.    Keep your throat moist by drinking 6 or more glasses of clear liquids every day.    Run a cool-air humidifier in your room overnight.    Stay away from cigarette smoke.     Check the air quality index,if air pollution gives you a sore throat. On high pollution days, try to limit outdoor time.    Suck on throat lozenges, cough drops, hard candy, ice chips, or frozen fruit-juice bars. Use the sugar-free versions if your diet or medical condition requires them.  Gargle to ease irritation  Gargling every hour or 2 can ease irritation. Try gargling with 1 of these solutions:    1/4 teaspoon of salt in 1/2 cup of warm water    An over-the-counter anesthetic gargle  Use medicine for more relief  Over-the-counter medicine can reduce sore throat symptoms. Ask your pharmacist if you have questions about which medicine to use. To prevent possible medicine interactions, let the pharmacist know what medicines you take. To decrease  symptoms:    Ease pain with anesthetic sprays. Aspirin or an aspirin substitute also helps. Remember, never give aspirin to anyone 18 or younger. Don't take aspirin if you are already taking blood thinners.     For sore throats caused by allergies, try antihistamines to block the allergic reaction.  Unless a sore throat is caused by a bacterial infection, antibiotics won t help you.  Prevent future sore throats  Prevention tips include:    Stop smoking or reduce contact with secondhand smoke. Smoke irritates the tender throat lining.    Limit contact with pets and with allergy-causing substances, such as pollen and mold.    Wash your hands often when you re around someone with a sore throat or cold. This will keep viruses or bacteria from spreading.    Limit outdoor time when air pollution is bad.    Don t strain your vocal cords.  When to call your healthcare provider  Contact your healthcare provider if you have:    Fever of 100.4 F (38.0 C) or higher, or as directed by your healthcare provider    White spots on the throat    Great Trouble swallowing    A skin rash    Recent exposure to someone else with strep bacteria    Severe hoarseness and swollen glands in the neck or jaw  Call 911  Call 911 if any of the following occur:    Trouble breathing or catching your breath    Drooling and problems swallowing    Wheezing    Unable to talk    Feeling dizzy or faint    Feeling of doom  [employer] last reviewed this educational content on 9/1/2019 2000-2021 [employer]. All rights reserved. This information is not intended as a substitute for professional medical care. Always follow your healthcare professional's instructions.               Raúl Jay PA-C  [facility]    Subjective   51 year old who presents to clinic today for the following health issues:    Urgent Care, Sweats, Fatigue, and Pharyngitis       HPI     Acute Illness  Acute illness concerns: Chills, fatigue, sore  throat   Onset/Duration: Last night  Symptoms:  Fever: no  Chills/Sweats: YES  Headache (location?): no  Sinus Pressure: no  Conjunctivitis:  no  Ear Pain: no  Rhinorrhea: no  Congestion: no  Sore Throat: YES, more irritation.   Cough: no  Wheeze: no  Decreased Appetite: no  Nausea: no  Vomiting: no  Diarrhea: no  Dysuria/Freq.: no  Dysuria or Hematuria: no  Fatigue/Achiness: YES  Sick/Strep Exposure: YES  Therapies tried and outcome: None    Review of Systems   Review of Systems   See HPI     Objective    Temp: 98.8  F (37.1  C) Temp src: Oral BP: 124/81 Pulse: 85     SpO2: 97 %       Physical Exam   Physical Exam  Constitutional:       General: He is not in acute distress.     Appearance: Normal appearance. He is normal weight. He is not ill-appearing, toxic-appearing or diaphoretic.   HENT:      Head: Normocephalic and atraumatic.      Mouth/Throat:      Mouth: Mucous membranes are moist.      Pharynx: Posterior oropharyngeal erythema present. No oropharyngeal exudate.   Eyes:      Extraocular Movements: Extraocular movements intact.      Conjunctiva/sclera: Conjunctivae normal.      Pupils: Pupils are equal, round, and reactive to light.   Cardiovascular:      Rate and Rhythm: Normal rate and regular rhythm.      Pulses: Normal pulses.      Heart sounds: Normal heart sounds. No murmur heard.   No friction rub. No gallop.    Pulmonary:      Effort: Pulmonary effort is normal. No respiratory distress.      Breath sounds: Normal breath sounds. No stridor. No wheezing, rhonchi or rales.   Chest:      Chest wall: No tenderness.   Musculoskeletal:      Cervical back: Normal range of motion. Tenderness present.   Lymphadenopathy:      Cervical: Cervical adenopathy present.   Neurological:      General: No focal deficit present.      Mental Status: He is alert and oriented to person, place, and time. Mental status is at baseline.      Gait: Gait normal.   Psychiatric:         Mood and Affect: Mood normal.          Behavior: Behavior normal.         Thought Content: Thought content normal.         Judgment: Judgment normal.          Results for orders placed or performed in visit on 08/16/21 (from the past 24 hour(s))   Streptococcus A Rapid Screen w/Reflex to PCR - Clinic Collect    Specimen: Throat; Swab   Result Value Ref Range    Group A Strep antigen Positive (A) Negative

## 2021-08-16 NOTE — PATIENT INSTRUCTIONS
Patient Education     Self-Care for Sore Throats     Sore throats happen for many reasons, such as colds, allergies, cigarette smoke, air pollution, and infections caused by viruses or bacteria. In any case, your throat becomes red and sore. Your goal for self-care is to reduce your discomfort while giving your throat a chance to heal.  Moisten and soothe your throat  Tips include the following:    Try a sip of water first thing after waking up.    Keep your throat moist by drinking 6 or more glasses of clear liquids every day.    Run a cool-air humidifier in your room overnight.    Stay away from cigarette smoke.     Check the air quality index,if air pollution gives you a sore throat. On high pollution days, try to limit outdoor time.    Suck on throat lozenges, cough drops, hard candy, ice chips, or frozen fruit-juice bars. Use the sugar-free versions if your diet or medical condition requires them.  Gargle to ease irritation  Gargling every hour or 2 can ease irritation. Try gargling with 1 of these solutions:    1/4 teaspoon of salt in 1/2 cup of warm water    An over-the-counter anesthetic gargle  Use medicine for more relief  Over-the-counter medicine can reduce sore throat symptoms. Ask your pharmacist if you have questions about which medicine to use. To prevent possible medicine interactions, let the pharmacist know what medicines you take. To decrease symptoms:    Ease pain with anesthetic sprays. Aspirin or an aspirin substitute also helps. Remember, never give aspirin to anyone 18 or younger. Don't take aspirin if you are already taking blood thinners.     For sore throats caused by allergies, try antihistamines to block the allergic reaction.  Unless a sore throat is caused by a bacterial infection, antibiotics won t help you.  Prevent future sore throats  Prevention tips include:    Stop smoking or reduce contact with secondhand smoke. Smoke irritates the tender throat lining.    Limit contact with  pets and with allergy-causing substances, such as pollen and mold.    Wash your hands often when you re around someone with a sore throat or cold. This will keep viruses or bacteria from spreading.    Limit outdoor time when air pollution is bad.    Don t strain your vocal cords.  When to call your healthcare provider  Contact your healthcare provider if you have:    Fever of 100.4 F (38.0 C) or higher, or as directed by your healthcare provider    White spots on the throat    Great Trouble swallowing    A skin rash    Recent exposure to someone else with strep bacteria    Severe hoarseness and swollen glands in the neck or jaw  Call 911  Call 911 if any of the following occur:    Trouble breathing or catching your breath    Drooling and problems swallowing    Wheezing    Unable to talk    Feeling dizzy or faint    Feeling of doom  [place] last reviewed this educational content on 9/1/2019 2000-2021 [employer]. All rights reserved. This information is not intended as a substitute for professional medical care. Always follow your healthcare professional's instructions.

## 2021-09-19 ENCOUNTER — HEALTH MAINTENANCE LETTER (OUTPATIENT)
Age: 51
End: 2021-09-19

## 2021-10-26 ENCOUNTER — MYC MEDICAL ADVICE (OUTPATIENT)
Dept: FAMILY MEDICINE | Facility: CLINIC | Age: 51
End: 2021-10-26

## 2021-11-03 ENCOUNTER — IMMUNIZATION (OUTPATIENT)
Dept: NURSING | Facility: CLINIC | Age: 51
End: 2021-11-03
Payer: COMMERCIAL

## 2021-11-03 PROCEDURE — 0004A PR COVID VAC PFIZER DIL RECON 30 MCG/0.3 ML IM: CPT

## 2021-11-03 PROCEDURE — 91300 PR COVID VAC PFIZER DIL RECON 30 MCG/0.3 ML IM: CPT

## 2021-11-09 ENCOUNTER — E-VISIT (OUTPATIENT)
Dept: URGENT CARE | Facility: URGENT CARE | Age: 51
End: 2021-11-09
Payer: COMMERCIAL

## 2021-11-09 DIAGNOSIS — H00.015 HORDEOLUM EXTERNUM OF LEFT LOWER EYELID: Primary | ICD-10-CM

## 2021-11-09 PROCEDURE — 99421 OL DIG E/M SVC 5-10 MIN: CPT | Performed by: FAMILY MEDICINE

## 2021-11-09 RX ORDER — ERYTHROMYCIN 5 MG/G
0.5 OINTMENT OPHTHALMIC AT BEDTIME
Qty: 3.5 G | Refills: 0 | Status: SHIPPED | OUTPATIENT
Start: 2021-11-09 | End: 2021-11-16

## 2021-11-22 ENCOUNTER — MYC MEDICAL ADVICE (OUTPATIENT)
Dept: FAMILY MEDICINE | Facility: CLINIC | Age: 51
End: 2021-11-22
Payer: COMMERCIAL

## 2021-12-07 NOTE — PROGRESS NOTES
"Nurse Note      Itinerary:  [facility]      Departure Date: 2/19/22      Return Date: 2-26-22      Length of Trip 7 days      Reason for Travel: Tourism           Urban or rural: both      Accommodations: Hotel        IMMUNIZATION HISTORY  Have you received any immunizations within the past 4 weeks?  Yes  Have you ever fainted from having your blood drawn or from an injection?  No  Have you ever had a fever reaction to vaccination?  Yes  Have you ever had any bad reaction or side effect from any vaccination?  No  Have you ever had hepatitis A or B vaccine?  Yes  Do you live (or work closely) with anyone who has AIDS, an AIDS-like condition, any other immune disorder or who is on chemotherapy for cancer?  No  Do you have a family history of immunodeficiency?  No  Have you received any injection of immune globulin or any blood products during the past 12 months?  No    Patient roomed by Tyesha Lamb is a 51 year old male seen today alone for counsultation for international travel.   Patient will be departing in  2+ month(s) and  traveling with organized tour group.      Patient itinerary :  will be in the urban and rural region of [facility] ( [place])  which risk for Dengue Fever, food borne illnesses and Covid. exposure.      Patient's activities will include sightseeing, camping and hiking.    Patient's country of birth is [place]    Special medical concerns: none  Pre-travel questionnaire was completed by patient and reviewed by provider.     Vitals: /89   Pulse 64   Temp 97.1  F (36.2  C)   Ht 1.803 m (5' 11\")   Wt 73.4 kg (161 lb 12.8 oz)   SpO2 98%   BMI 22.57 kg/m    BMI= Body mass index is 22.57 kg/m .    EXAM:  General:  Well-nourished, well-developed in no acute distress.  Appears to be stated age, interacts appropriately and expresses understanding of information given to patient.    Current Outpatient Medications   Medication Sig Dispense Refill     Cholecalciferol (VITAMIN D) " 1000 UNITS capsule Take 1 capsule by mouth daily (Patient not taking: Reported on 8/16/2021)       MELATONIN PO Take 3 mg by mouth nightly as needed (Patient not taking: Reported on 8/16/2021)       Omega-3 Fatty Acids (FISH OIL PO)  (Patient not taking: Reported on 8/16/2021)       triamcinolone (KENALOG) 0.1 % external ointment Apply sparingly to affected area two times daily as needed (Patient not taking: Reported on 8/16/2021) 15 g 1     valACYclovir (VALTREX) 1000 mg tablet Take 2 tablets (2,000 mg) by mouth 2 times daily (Patient not taking: Reported on 8/16/2021) 16 tablet 1     Patient Active Problem List   Diagnosis     Family history of malignant neoplasm of prostate     HYPERLIPIDEMIA LDL GOAL <160     Herpes simplex virus (HSV) infection     Insomnia     Bilateral low back pain with sciatica, sciatica laterality unspecified     Allergies   Allergen Reactions     Amoxicillin      Turned Red     Seasonal Allergies          Immunizations discussed include:   Covid 19: Up to date  Hepatitis A:  Ordered/given today, risks, benefits and side effects reviewed  Hepatitis B: Up to date  Influenza: Up to date  Typhoid: Ordered/given today, risks, benefits and side effects reviewed  Rabies: Declined  reviewed managment of a animal bite or scratch (washing wound, seek medical care within 24 hours for post exposure prophylaxis )  Yellow Fever: Not indicated  Japanese Encephalitis: Not indicated  Meningococcus: Not indicated  Tetanus/Diphtheria: Up to date  Measles/Mumps/Rubella: Up to date  Cholera: Not needed  Polio: Not indicated  Pneumococcal: Under age of 65  Varicella: Immune by disease history per patient report  Shingrix: deferred  HPV:  Not indicated     TB: low risk    ASSESSMENT/PLAN:  Reji was seen today for travel clinic.    Diagnoses and all orders for this visit:    Travel advice encounter  -     azithromycin (ZITHROMAX) 500 MG tablet; Take 1 tablet (500 mg) by mouth daily for 3 doses Take 1 tablet a  day for up to 3 days for severe diarrhea    Encounter for screening laboratory testing for COVID-19 virus  -     Asymptomatic COVID-19 Virus (Coronavirus) by PCR Nose; Future  -     Asymptomatic COVID-19 Virus (Coronavirus) by PCR Nose    Other orders  -     HEPATITIS A VACCINE (ADULT)  -     TYPHOID VACCINE, IM      I have reviewed general recommendations for safe travel   including: food/water precautions, insect precautions, safer sex   practices given high prevalence of Zika, HIV and other STDs,   roadway safety. Educational materials and [employer] report provided.    Malaraia prophylaxis recommended: not needed  Symptomatic treatment for traveler's diarrhea: azithromycin    Personal protective measures reviewed including hand sanitizing and contact precautions for the prevention of viral illnesses. Cover coughs and masking  during travel and upon return.  Current COVID 19 pandemic.   Monitor / follow current CDC guidelines.    Country specific and CDC Covid 19  testing requirements and resources given to patient.    Covid 19 PCR test ordered.  Instructions for scheduling and resulting through My Chart given to patient.       Evacuation insurance advised and resources were provided to patient.    Total visit time 30 minutes  with over 50% of time spent counseling patient as detailed above.    Marguerite Herrera CNP

## 2021-12-08 ENCOUNTER — OFFICE VISIT (OUTPATIENT)
Dept: FAMILY MEDICINE | Facility: CLINIC | Age: 51
End: 2021-12-08
Payer: COMMERCIAL

## 2021-12-08 VITALS
SYSTOLIC BLOOD PRESSURE: 137 MMHG | OXYGEN SATURATION: 98 % | DIASTOLIC BLOOD PRESSURE: 89 MMHG | WEIGHT: 161.8 LBS | HEIGHT: 71 IN | BODY MASS INDEX: 22.65 KG/M2 | HEART RATE: 64 BPM | TEMPERATURE: 97.1 F

## 2021-12-08 DIAGNOSIS — Z11.52 ENCOUNTER FOR SCREENING LABORATORY TESTING FOR COVID-19 VIRUS: ICD-10-CM

## 2021-12-08 DIAGNOSIS — Z71.84 TRAVEL ADVICE ENCOUNTER: Primary | ICD-10-CM

## 2021-12-08 PROCEDURE — 90472 IMMUNIZATION ADMIN EACH ADD: CPT | Performed by: NURSE PRACTITIONER

## 2021-12-08 PROCEDURE — 90691 TYPHOID VACCINE IM: CPT | Performed by: NURSE PRACTITIONER

## 2021-12-08 PROCEDURE — 90471 IMMUNIZATION ADMIN: CPT | Performed by: NURSE PRACTITIONER

## 2021-12-08 PROCEDURE — 99402 PREV MED CNSL INDIV APPRX 30: CPT | Mod: 25 | Performed by: NURSE PRACTITIONER

## 2021-12-08 PROCEDURE — 90632 HEPA VACCINE ADULT IM: CPT | Performed by: NURSE PRACTITIONER

## 2021-12-08 RX ORDER — AZITHROMYCIN 500 MG/1
500 TABLET, FILM COATED ORAL DAILY
Qty: 3 TABLET | Refills: 0 | Status: SHIPPED | OUTPATIENT
Start: 2021-12-08 | End: 2021-12-11

## 2021-12-08 ASSESSMENT — MIFFLIN-ST. JEOR: SCORE: 1601.05

## 2021-12-08 NOTE — PATIENT INSTRUCTIONS
Thank you for visiting the [facility] : [phone]  Today December 8, 2021 you received the    Hepatitis A Vaccine - Please return on 6/6/22 or later for your 2nd and final dose.    Typhoid - injectable. This vaccine is valid for two years.       Follow up vaccine appointments can be made as a NURSE ONLY visit at the Travel Clinic, (BE PREPARED TO WAIT, ) or at designated Supply Pharmacies.    If you are receiving the Rabies vaccines series, it is important that you follow the exact schedule ordered.     Pre-travel     We recommend that you purchase Medical Evacuation Insurance prior to your departure.  Https://wwwnc.cdc.gov/travel/page/insurance    Marsing your travel plans with the [place] Department of State through STEP ( Smart Traveler Enrollment Program ) https://step.Spectafy.gov.  STEP is a free service to allow [place] citizens and nationals traveling and living abroad to enroll their trip with the nearest [place] Embassy or Consulate.    Animal Exposure: Avoid all mammals even if they look healthy.  If there is a bite, scratch or even a lick, wash area immediately with soap and water for 15 minutes and seek medical care within 24 hours for evaluation of Rabies post exposure treatment.  Contact your Medical Evacuation Insurance.    COVID 19 (Sars Cov2) prevention strategies  Physical distancing: Maintain 6 foot (2m) from others.              Avoid large gatherings and public transportation.   Avoid indoor shopping malls, theaters and restaurants   Practice consistent mask wearing covering the nose, mouth and underneath the chin when unable to maintain 6 foot distance from others.  Hand washing: frequent, thorough handwashing with soap and water for 20 seconds (or using a hand  containing 60% alcohol)   Avoid touching face, nose, eyes, mouth unless you have done appropriate hand washing as above.   Clean high touch surfaces with approved disinfectant against Covid 19   (70% Ethanol ) or a bleach solution (add 20 mL (4 teaspoons) of bleach to 1 L (1 quart) of water;)  Be careful not to breath or touch bleach.      Travel Covid 19 Testing:  updated 12/06/2021  International travelers: Pre-travel: diagnostic testing (antigen or PCR) may be required for entry:  See country specific Embassy websites or airline websites.    [place] ENTRY Requirements: Effective December 6, 2021, all international arrivals to the [place] (regardless of vaccination status or citizenship status) by air are required to have a negative predeparture COVID-19 result from a test taken no more than 1 calendar day prior to departure of the flight to the [place]. Many complex scenarios may result from the 1-day rule. For example, for a flight that arrives in the [place] on a Monday, the test must have been taken no earlier than Sunday local time in the departure city. If the itinerary contains multiple flights, the test can be taken 1 day prior to departure of the first flight or can be taken en route, as long as the connecting airport is not in the [place]. If the test is unable to be taken en route, the traveler will not be able to enter the [place], or if the test is taken en route and is positive, the traveler will have to isolate in that location. If the itinerary contains 1 or more overnight stays en route to the [place], the test must have been taken 1 calendar day before the flight that will enter the [place]; however, if the itinerary has an overnight connection due to limitations in flight availability, retesting will not be required. If the first flight within an itinerary is delayed due to severe weather, aircraft mechanical issues, or other issues outside of the traveler's control, the traveler will only need to be retested if the delay causes the original test to be 24 hours or more past the 1-day window. If a connecting flight is delayed due to any of the above issues, the traveler will only need to be retested if the delay causes the  original test to be 48 hours or more past the 1-day window. If a trip of less than 1 day is made out the [place], a viral test taken in the [place] may be used as a predeparture test as long as the test was taken no more than 1 day prior to rearrival in the [place]; however, if a delay occurs on the return trip and the predeparture test is out of the 1-day window, the traveler will need to be retested before returning to the [place]. Noncitizen nonimmigrants who are unvaccinated remain banned from entry    Post travel: CDC recommends getting tested 3-5 days after your trip AND stay home and self-quarantine for 7 days.      COVID-19 testing scheduling number for pre-travel through [facility]  [phone] (Must have an order). Available 24 hours a day.  You can also schedule through My Chart.     Post-travel illness:  Contact your provider or [facility] if you develop a fever, rash, cough, diarrhea or other symptoms for up to 1 year after travel.  Inform your healthcare provider when and where you traveled to.    Please call the [facility] with any questions [phone]  Or send your provider a 'My Chart' note.

## 2021-12-10 NOTE — PROGRESS NOTES
SUBJECTIVE:   CC: Reji Lamb is an 51 year old male who presents for preventative health visit.       Patient has been advised of split billing requirements and indicates understanding: Yes     Healthy Habits:     Getting at least 3 servings of Calcium per day:  Yes    Bi-annual eye exam:  Yes    Dental care twice a year:  Yes    Sleep apnea or symptoms of sleep apnea:  None    Diet:  Regular (no restrictions)    Frequency of exercise:  6-7 days/week    Duration of exercise:  45-60 minutes    Taking medications regularly:  Yes    Medication side effects:  Not applicable    PHQ-2 Total Score: 0    Additional concerns today:  No    Planning for [place] trip in 2/22.  Saw travel clinic, has abx and had first typhoid and Hep A.  Will get second Hep A when back.    Updates on health...    Derm issues-  --Stye in lower left eyelid.  Took a long time to improve.  Did hot packs, did abx ointment, unsure if it helped, still took a long time to improve.  --Angular chelitis - saw Dermatology, topical steroid and topical anti-fungal.  Had used a new toothpaste.  --Had full body exam through derm- no bx's needed.      Lipids- LDL has ranged from 109 in '13 to 170s in '09 and '12.  Up a bit last year because of COVID hunkering down, stopped [employer], still not going, but doing exercise on his own.  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    Diet- pretty good, consistent.  Avoids fried foods.  40% plate vegetables/fruits.      HSV- rare outbreaks, takes valtrex early if sx's, facial.      Insomnia- usually does fine, usually can get back to sleep after reading x 20 minutes.  Better than previous years, maybe less stressed, maybe knows how to deal with it better now.  Rarely uses melatonin.      Today's PHQ-2 Score:   PHQ-2 ( 1999 Pfizer) 12/12/2021   Q1: Little interest or pleasure in doing things 0   Q2: Feeling down, depressed or hopeless 0    PHQ-2 Score 0   PHQ-2 Total Score (12-17 Years)- Positive if 3 or more points; Administer PHQ-A if positive -   Q1: Little interest or pleasure in doing things Not at all   Q2: Feeling down, depressed or hopeless Not at all   PHQ-2 Score 0       Abuse: Current or Past(Physical, Sexual or Emotional)- No  Do you feel safe in your environment? Yes    Have you ever done Advance Care Planning? (For example, a Health Directive, POLST, or a discussion with a medical provider or your loved ones about your wishes): Yes, advance care planning is on file.    Social History     Tobacco Use     Smoking status: Never Smoker     Smokeless tobacco: Never Used   Substance Use Topics     Alcohol use: Yes     Comment: ~6 drinks/wk     If you drink alcohol do you typically have >3 drinks per day or >7 drinks per week? No    No flowsheet data found.    Last PSA:   PSA   Date Value Ref Range Status   10/28/2020 1.16 0 - 4 ug/L Final     Comment:     Assay Method:  Chemiluminescence using Siemens Vista analyzer       Reviewed orders with patient. Reviewed health maintenance and updated orders accordingly - Yes      Lab work is in process  Labs reviewed in EPIC  BP Readings from Last 3 Encounters:   12/15/21 120/77   12/08/21 137/89   08/16/21 124/81    Wt Readings from Last 3 Encounters:   12/15/21 72.1 kg (158 lb 14.4 oz)   12/08/21 73.4 kg (161 lb 12.8 oz)   08/16/21 70.3 kg (155 lb)                  Patient Active Problem List   Diagnosis     Family history of malignant neoplasm of prostate     HYPERLIPIDEMIA LDL GOAL <160     Herpes simplex virus (HSV) infection     Insomnia     Bilateral low back pain with sciatica, sciatica laterality unspecified     Past Surgical History:   Procedure Laterality Date     COLONOSCOPY N/A 7/19/2018    Procedure: COLONOSCOPY;  colonoscopy;  Surgeon: Sha Hooker MD;  Location:  GI     HERNIA REPAIR, INGUINAL RT/LT  2004    bilateral R>L     ZZC NONSPECIFIC PROCEDURE  Age 3    Cystectomy from  eyelid       Social History     Tobacco Use     Smoking status: Never Smoker     Smokeless tobacco: Never Used   Substance Use Topics     Alcohol use: Yes     Comment: ~6 drinks/wk     Family History   Problem Relation Age of Onset     Hypertension Mother      Breast Cancer Mother 71        lumpectomy/radiation, dx at 71, another lumpectomy at 75     Prostate Cancer Father         Dx'd age 55-60 approx, did well after txt, no issues     Cancer Father 48        esophageal cancer, partner smoked, some etoh     Allergies Sister      Lipids Brother         visual changes, plaque found in eye, on statins since his 40s     Deep Vein Thrombosis Brother         has been on coumadin     C.A.D. No family hx of      Cerebrovascular Disease No family hx of          Current Outpatient Medications   Medication Sig Dispense Refill     valACYclovir (VALTREX) 1000 mg tablet Take 2 tablets (2,000 mg) by mouth 2 times daily 16 tablet 1     Cholecalciferol (VITAMIN D) 1000 UNITS capsule Take 1 capsule by mouth daily (Patient not taking: Reported on 8/16/2021)       MELATONIN PO Take 3 mg by mouth nightly as needed (Patient not taking: Reported on 8/16/2021)       Omega-3 Fatty Acids (FISH OIL PO)  (Patient not taking: Reported on 8/16/2021)       triamcinolone (KENALOG) 0.1 % external ointment Apply sparingly to affected area two times daily as needed (Patient not taking: Reported on 8/16/2021) 15 g 1     Allergies   Allergen Reactions     Amoxicillin      Turned Red     Seasonal Allergies      Recent Labs   Lab Test 10/28/20  0837 09/20/19  0840 05/02/18  1128   * 132* 144*   HDL 67 68 75   TRIG 95 82 48   CR 0.73  --   --    GFRESTIMATED >90  --   --    GFRESTBLACK >90  --   --    POTASSIUM 4.0  --   --         Reviewed and updated as needed this visit by clinical staff  Tobacco  Allergies  Meds  Problems  Med Hx  Surg Hx  Fam Hx         Reviewed and updated as needed this visit by Provider  Tobacco  Allergies  Meds   "Problems  Med Hx  Surg Hx  Fam Hx            Review of Systems   Constitutional: Negative for chills and fever.   HENT: Negative for congestion, ear pain, hearing loss and sore throat.    Eyes: Negative for pain and visual disturbance.   Respiratory: Negative for cough and shortness of breath.    Cardiovascular: Negative for chest pain, palpitations and peripheral edema.   Gastrointestinal: Negative for abdominal pain, constipation, diarrhea, heartburn, hematochezia and nausea.   Genitourinary: Negative for dysuria, frequency, genital sores, hematuria, impotence, penile discharge and urgency.   Musculoskeletal: Negative for arthralgias, joint swelling and myalgias.   Skin: Negative for rash.   Neurological: Negative for dizziness, weakness, headaches and paresthesias.   Psychiatric/Behavioral: Negative for mood changes. The patient is not nervous/anxious.        OBJECTIVE:   /77   Pulse 72   Temp 97.4  F (36.3  C)   Ht 1.803 m (5' 11\")   Wt 72.1 kg (158 lb 14.4 oz)   SpO2 99%   BMI 22.16 kg/m      Physical Exam  GENERAL: healthy, alert and no distress  EYES: Eyes grossly normal to inspection, PERRL and conjunctivae and sclerae normal  HENT: ear canals and TM's normal  NECK: no adenopathy, no asymmetry, masses, or scars and thyroid normal to palpation  RESP: lungs clear to auscultation - no rales, rhonchi or wheezes  CV: regular rate and rhythm, normal S1 S2, no S3 or S4, no murmur, click or rub, no peripheral edema and peripheral pulses strong  ABDOMEN: soft, nontender, no hepatosplenomegaly, no masses and bowel sounds normal  MS: no gross musculoskeletal defects noted, no edema  SKIN: no suspicious lesions or rashes  NEURO: Normal strength and tone, mentation intact and speech normal  PSYCH: mentation appears normal, affect normal/bright    Diagnostic Test Results:  Labs reviewed in Epic    ASSESSMENT/PLAN:       ICD-10-CM    1. Routine general medical examination at a health care facility  Z00.00 " "PSA, screen     PSA, screen   2. Herpes simplex virus (HSV) infection  B00.9 Basic metabolic panel  (Ca, Cl, CO2, Creat, Gluc, K, Na, BUN)     valACYclovir (VALTREX) 1000 mg tablet     Basic metabolic panel  (Ca, Cl, CO2, Creat, Gluc, K, Na, BUN)   3. Family history of malignant neoplasm of prostate  Z80.42 PSA, screen     PSA, screen   4. Hyperlipidemia LDL goal <160  E78.5 Lipid panel reflex to direct LDL Fasting     Lipid panel reflex to direct LDL Fasting   5. Seborrheic dermatitis  L21.9        CPE- We discussed ways to maintain a healthy lifestyle with sensible eating, regular exercise and self cares. We dicussed calcium and Vitamin D intake, vaccinations and preventive health screens.  See orders above for tests ordered and screening needed.  --No immunizations needed today.  Will need second Hep A in future.  He will also check with new insurance in Jan re: coverage for shingrix (clinic okay or pharmacy better?).  --Skin issues improving, had full body skin check with derm.  --Will check fasting lipids, BMP and PSA check today (discussed pros/cons of PSA screening).  --HSV- rare outbreaks, valtrex helpful.  Will send in refills and check BMP today.        Patient has been advised of split billing requirements and indicates understanding: Yes     COUNSELING:   Reviewed preventive health counseling, as reflected in patient instructions    Estimated body mass index is 22.16 kg/m  as calculated from the following:    Height as of this encounter: 1.803 m (5' 11\").    Weight as of this encounter: 72.1 kg (158 lb 14.4 oz).         He reports that he has never smoked. He has never used smokeless tobacco.      Counseling Resources:  ATP IV Guidelines  Pooled Cohorts Equation Calculator  FRAX Risk Assessment  ICSI Preventive Guidelines  Dietary Guidelines for Americans, 2010  USDA's MyPlate  ASA Prophylaxis  Lung CA Screening    Veronica Sampson MD  [facility]  "

## 2021-12-12 ASSESSMENT — ENCOUNTER SYMPTOMS
SORE THROAT: 0
FREQUENCY: 0
HEMATOCHEZIA: 0
CHILLS: 0
EYE PAIN: 0
HEADACHES: 0
PALPITATIONS: 0
CONSTIPATION: 0
NERVOUS/ANXIOUS: 0
DIZZINESS: 0
JOINT SWELLING: 0
FEVER: 0
DYSURIA: 0
HEARTBURN: 0
COUGH: 0
HEMATURIA: 0
SHORTNESS OF BREATH: 0
NAUSEA: 0
PARESTHESIAS: 0
MYALGIAS: 0
DIARRHEA: 0
WEAKNESS: 0
ABDOMINAL PAIN: 0
ARTHRALGIAS: 0

## 2021-12-15 ENCOUNTER — OFFICE VISIT (OUTPATIENT)
Dept: FAMILY MEDICINE | Facility: CLINIC | Age: 51
End: 2021-12-15
Payer: COMMERCIAL

## 2021-12-15 VITALS
HEART RATE: 72 BPM | OXYGEN SATURATION: 99 % | SYSTOLIC BLOOD PRESSURE: 120 MMHG | HEIGHT: 71 IN | BODY MASS INDEX: 22.25 KG/M2 | WEIGHT: 158.9 LBS | DIASTOLIC BLOOD PRESSURE: 77 MMHG | TEMPERATURE: 97.4 F

## 2021-12-15 DIAGNOSIS — Z80.42 FAMILY HISTORY OF MALIGNANT NEOPLASM OF PROSTATE: ICD-10-CM

## 2021-12-15 DIAGNOSIS — Z00.00 ROUTINE GENERAL MEDICAL EXAMINATION AT A HEALTH CARE FACILITY: Primary | ICD-10-CM

## 2021-12-15 DIAGNOSIS — B00.9 HERPES SIMPLEX VIRUS (HSV) INFECTION: ICD-10-CM

## 2021-12-15 DIAGNOSIS — E78.5 HYPERLIPIDEMIA LDL GOAL <160: ICD-10-CM

## 2021-12-15 DIAGNOSIS — L21.9 SEBORRHEIC DERMATITIS: ICD-10-CM

## 2021-12-15 PROCEDURE — 36415 COLL VENOUS BLD VENIPUNCTURE: CPT | Performed by: FAMILY MEDICINE

## 2021-12-15 PROCEDURE — G0103 PSA SCREENING: HCPCS | Performed by: FAMILY MEDICINE

## 2021-12-15 PROCEDURE — 99396 PREV VISIT EST AGE 40-64: CPT | Performed by: FAMILY MEDICINE

## 2021-12-15 PROCEDURE — 80048 BASIC METABOLIC PNL TOTAL CA: CPT | Performed by: FAMILY MEDICINE

## 2021-12-15 PROCEDURE — 80061 LIPID PANEL: CPT | Performed by: FAMILY MEDICINE

## 2021-12-15 RX ORDER — VALACYCLOVIR HYDROCHLORIDE 1 G/1
2000 TABLET, FILM COATED ORAL 2 TIMES DAILY
Qty: 16 TABLET | Refills: 1 | Status: SHIPPED | OUTPATIENT
Start: 2021-12-15 | End: 2023-01-12

## 2021-12-15 ASSESSMENT — ENCOUNTER SYMPTOMS
DIARRHEA: 0
COUGH: 0
PALPITATIONS: 0
DIZZINESS: 0
HEMATURIA: 0
ARTHRALGIAS: 0
HEADACHES: 0
PARESTHESIAS: 0
HEARTBURN: 0
FEVER: 0
JOINT SWELLING: 0
SHORTNESS OF BREATH: 0
ABDOMINAL PAIN: 0
SORE THROAT: 0
NAUSEA: 0
EYE PAIN: 0
NERVOUS/ANXIOUS: 0
FREQUENCY: 0
DYSURIA: 0
MYALGIAS: 0
HEMATOCHEZIA: 0
CHILLS: 0
WEAKNESS: 0
CONSTIPATION: 0

## 2021-12-15 ASSESSMENT — MIFFLIN-ST. JEOR: SCORE: 1587.9

## 2021-12-16 LAB
ANION GAP SERPL CALCULATED.3IONS-SCNC: 5 MMOL/L (ref 3–14)
BUN SERPL-MCNC: 15 MG/DL (ref 7–30)
CALCIUM SERPL-MCNC: 9.2 MG/DL (ref 8.5–10.1)
CHLORIDE BLD-SCNC: 105 MMOL/L (ref 94–109)
CO2 SERPL-SCNC: 28 MMOL/L (ref 20–32)
CREAT SERPL-MCNC: 0.76 MG/DL (ref 0.66–1.25)
GFR SERPL CREATININE-BSD FRML MDRD: >90 ML/MIN/1.73M2
GLUCOSE BLD-MCNC: 78 MG/DL (ref 70–99)
POTASSIUM BLD-SCNC: 3.9 MMOL/L (ref 3.4–5.3)
PSA SERPL-MCNC: 1.14 UG/L (ref 0–4)
SODIUM SERPL-SCNC: 138 MMOL/L (ref 133–144)

## 2021-12-16 NOTE — RESULT ENCOUNTER NOTE
Here are your lab results from your recent visit...  -Your basic metabolic panel (which includes electrolyte levels, blood sugar level and kidney function tests) is normal.  -Your PSA level looks stable and low.    Please let me know if you have any questions.  Best,   Hugh Sampson MD

## 2021-12-18 LAB
CHOLEST SERPL-MCNC: 256 MG/DL
FASTING STATUS PATIENT QL REPORTED: YES
HDLC SERPL-MCNC: 83 MG/DL
LDLC SERPL CALC-MCNC: 156 MG/DL
NONHDLC SERPL-MCNC: 173 MG/DL
TRIGL SERPL-MCNC: 83 MG/DL

## 2021-12-20 NOTE — RESULT ENCOUNTER NOTE
-Your cholesterol panel is now back, and it looks just a bit worse with a higher LDL (the bad cholesterol), but your HDL (the good cholesterol) looks higher/better.  Your triglycerides still look very good.    Best,   Hugh Sampson MD

## 2021-12-27 ENCOUNTER — OFFICE VISIT (OUTPATIENT)
Dept: URGENT CARE | Facility: URGENT CARE | Age: 51
End: 2021-12-27
Payer: COMMERCIAL

## 2021-12-27 ENCOUNTER — E-VISIT (OUTPATIENT)
Dept: URGENT CARE | Facility: CLINIC | Age: 51
End: 2021-12-27
Payer: COMMERCIAL

## 2021-12-27 VITALS
SYSTOLIC BLOOD PRESSURE: 160 MMHG | DIASTOLIC BLOOD PRESSURE: 87 MMHG | WEIGHT: 161 LBS | TEMPERATURE: 98.4 F | HEART RATE: 77 BPM | OXYGEN SATURATION: 98 % | BODY MASS INDEX: 22.45 KG/M2

## 2021-12-27 DIAGNOSIS — R21 RASH AND NONSPECIFIC SKIN ERUPTION: Primary | ICD-10-CM

## 2021-12-27 DIAGNOSIS — B02.9 HERPES ZOSTER WITHOUT COMPLICATION: Primary | ICD-10-CM

## 2021-12-27 PROCEDURE — 99213 OFFICE O/P EST LOW 20 MIN: CPT | Performed by: PHYSICIAN ASSISTANT

## 2021-12-27 PROCEDURE — 99207 PR NON-BILLABLE SERV PER CHARTING: CPT | Performed by: FAMILY MEDICINE

## 2021-12-27 RX ORDER — PREDNISONE 20 MG/1
TABLET ORAL
Qty: 20 TABLET | Refills: 0 | Status: SHIPPED | OUTPATIENT
Start: 2021-12-27 | End: 2023-01-12

## 2021-12-27 RX ORDER — VALACYCLOVIR HYDROCHLORIDE 1 G/1
1000 TABLET, FILM COATED ORAL 3 TIMES DAILY
Qty: 21 TABLET | Refills: 0 | Status: SHIPPED | OUTPATIENT
Start: 2021-12-27 | End: 2022-01-03

## 2021-12-27 NOTE — PROGRESS NOTES
Herpes zoster without complication  - predniSONE (DELTASONE) 20 MG tablet; Take 3 tabs by mouth daily x 3 days, then 2 tabs daily x 3 days, then 1 tab daily x 3 days, then 1/2 tab daily x 3 days.  - valACYclovir (VALTREX) 1000 mg tablet; Take 1 tablet (1,000 mg) by mouth 3 times daily for 7 days     See Patient Instructions  Patient Instructions     Patient Education     Shingles  Shingles is a viral infection caused by the same virus that causes chicken pox. Anyone who has had chicken pox may get shingles later in life. The virus stays in the body, but remains asleep (dormant). Shingles often occurs in older persons or persons with lowered immunity. But it can affect anyone at any age.  Shingles starts as a tingling patch of skin on one side of the body. Small, painful blisters may then appear. The rash rarely spreads to other parts of the body.  Exposure to shingles can't cause shingles. However, it can cause chicken pox in anyone who has not had chicken pox or has not been vaccinated. The contagious period ends when all blisters have crusted over, generally 1 to 2 weeks after the illness starts.  After the blisters heal, the affected skin may be sensitive or painful for weeks or months, gradually resolving over time. But, sometimes this can last longer and be permanent (called postherpetic neuralgia.)  Shingles vaccines are available. Vaccination can help prevent shingles or make it less painful. It is generally recommended for adults older than 50, even if you've had singles in the past. Talk with your healthcare provider about when to get vaccinated and which vaccine is best for you.  Home care    Medicines may be prescribed to help relieve pain. Take these medicines as directed. Ask your healthcare provider or pharmacist before using over-the-counter medicines for helping treat pain and itching.    In certain cases, antiviral medicines may be prescribed to reduce pain, shorten the illness, and prevent  neuralgia. Take these medicines as directed.    Compresses made from a solution of cool water mixed with cornstarch or baking soda may help relieve pain and itching.     Gently wash skin daily with soap and water to help prevent infection. Be certain to rinse off all of the soap, which can be irritating.    Trim fingernails and try not to scratch. Scratching the sores may leave scars.    Stay home from work or school until all blisters have formed a crust and you are no longer contagious.  Follow-up care  Follow up with your healthcare provider, or as directed.  When to seek medical advice    Fever of 100.4 F (38 C) or higher, or as directed by your healthcare provider    Affected skin is on the face or neck and any of the following occur:  ? Headache  ? Eye pain  ? Changes in vision  ? Sores near the eye  ? Weakness of facial muscles    Blisters occurring on new areas of the body    Pain, redness, or swelling of a joint    Signs of skin infection: colored drainage from the sores, warmth, increasing redness, fever, or increasing pain  [employer] last reviewed this educational content on 4/1/2018 2000-2021 [employer]. All rights reserved. This information is not intended as a substitute for professional medical care. Always follow your healthcare professional's instructions.               Raúl Jay PA-C  [facility] U[facility]    Subjective   51 year old who presents to clinic today for the following health issues:    Urgent Care and Derm Problem       HPI     Rash  Onset/Duration: 2-3 days  Description  Location: right buttock   Character: raised, red  Itching: no  Intensity:  none  Progression of Symptoms:  worsening  Accompanying signs and symptoms:   Fever: no  Body aches or joint pain: no  Sore throat symptoms: no  Recent cold symptoms: no  History:           Previous episodes of similar rash: None  New exposures:  None  Recent travel: no  Exposure to similar rash: no  Precipitating  or alleviating factors: None  Therapies tried and outcome: None       Review of Systems   Review of Systems   See HPI     Objective    Temp: 98.4  F (36.9  C) Temp src: Temporal BP: (!) 160/87 Pulse: 77     SpO2: 98 %       Physical Exam   Physical Exam  Constitutional:       Appearance: Normal appearance. He is normal weight.   HENT:      Head: Normocephalic and atraumatic.   Cardiovascular:      Rate and Rhythm: Normal rate and regular rhythm.      Pulses: Normal pulses.      Heart sounds: Normal heart sounds.   Pulmonary:      Effort: Pulmonary effort is normal. No respiratory distress.   Skin:     Findings: Rash present. Rash is vesicular. Rash is not crusting, macular, nodular, papular, purpuric, pustular or scaling.          Neurological:      General: No focal deficit present.      Mental Status: He is alert and oriented to person, place, and time. Mental status is at baseline.      Gait: Gait normal.   Psychiatric:         Mood and Affect: Mood normal.         Behavior: Behavior normal.         Thought Content: Thought content normal.         Judgment: Judgment normal.          No results found for this or any previous visit (from the past 24 hour(s)).

## 2021-12-27 NOTE — PATIENT INSTRUCTIONS
Dear Reji Lamb,    We are sorry you are not feeling well. Based on the responses you provided, it is recommended that you be seen in-person in urgent care so we can better evaluate your symptoms. Please click here to find the nearest urgent care location to you.   You will not be charged for this Visit. Thank you for trusting us with your care.    Génesis Braden MD    Dear Reji Lamb?     After reviewing your responses, I am unable to make a diagnosis that can be treated online.    You will not be charged for this eVisit.     We are dedicated to helping you achieve your best health and would like to see you in one of our many clinic locations - a primary care provider would be ideal for your concern.    Please use LOFTY to schedule a visit with a provider or call [phone]) to schedule at any of our locations.    Thanks for choosing?us?as your health care partner,?   ?   Génesis Braden MD?

## 2021-12-29 DIAGNOSIS — L21.9 SEBORRHEIC DERMATITIS: ICD-10-CM

## 2021-12-31 RX ORDER — TRIAMCINOLONE ACETONIDE 1 MG/G
OINTMENT TOPICAL
Qty: 15 G | Refills: 1 | Status: SHIPPED | OUTPATIENT
Start: 2021-12-31 | End: 2023-05-23

## 2021-12-31 NOTE — TELEPHONE ENCOUNTER
Routing refill request to provider for review/approval because:  Last rxd 10/2020.  Please authorize if appropriate.  Thanks,  Kelly Valentin RN

## 2022-01-28 ENCOUNTER — MYC MEDICAL ADVICE (OUTPATIENT)
Dept: FAMILY MEDICINE | Facility: CLINIC | Age: 52
End: 2022-01-28
Payer: COMMERCIAL

## 2022-01-28 DIAGNOSIS — Z11.52 ENCOUNTER FOR SCREENING LABORATORY TESTING FOR COVID-19 VIRUS: Primary | ICD-10-CM

## 2022-01-31 NOTE — TELEPHONE ENCOUNTER
LH,    Please see [employer] message.  Needs PCR order for travel  He was you 12/8/21.    Patient's  will be sending [employer] message to JF.  Patient did not see you and JF is PCP.    Thanks,  Sharmaine Lopez RN

## 2022-02-16 ENCOUNTER — LAB (OUTPATIENT)
Dept: LAB | Facility: CLINIC | Age: 52
End: 2022-02-16
Attending: NURSE PRACTITIONER
Payer: COMMERCIAL

## 2022-02-16 DIAGNOSIS — Z11.52 ENCOUNTER FOR SCREENING LABORATORY TESTING FOR COVID-19 VIRUS: ICD-10-CM

## 2022-02-16 LAB — SARS-COV-2 RNA RESP QL NAA+PROBE: NEGATIVE

## 2022-02-16 PROCEDURE — U0005 INFEC AGEN DETEC AMPLI PROBE: HCPCS

## 2022-02-16 PROCEDURE — U0003 INFECTIOUS AGENT DETECTION BY NUCLEIC ACID (DNA OR RNA); SEVERE ACUTE RESPIRATORY SYNDROME CORONAVIRUS 2 (SARS-COV-2) (CORONAVIRUS DISEASE [COVID-19]), AMPLIFIED PROBE TECHNIQUE, MAKING USE OF HIGH THROUGHPUT TECHNOLOGIES AS DESCRIBED BY CMS-2020-01-R: HCPCS

## 2022-02-17 NOTE — RESULT ENCOUNTER NOTE
Reji Lamb   YOB: 1970  Specimen Collected: 02/16/22  9:26 AM  CST    Your SARS CoV2 RT-PCR (Covid-19) RNA test is NEGATIVE!  We recommend that you print off these entire results and keep with your travel documents.     Have a safe and healthy trip      Marguerite Herrera CNP (Lori)

## 2022-03-28 ENCOUNTER — MYC MEDICAL ADVICE (OUTPATIENT)
Dept: FAMILY MEDICINE | Facility: CLINIC | Age: 52
End: 2022-03-28
Payer: COMMERCIAL

## 2022-05-13 ENCOUNTER — MYC MEDICAL ADVICE (OUTPATIENT)
Dept: FAMILY MEDICINE | Facility: CLINIC | Age: 52
End: 2022-05-13
Payer: COMMERCIAL

## 2022-05-13 DIAGNOSIS — G47.09 OTHER INSOMNIA: ICD-10-CM

## 2022-05-17 RX ORDER — TRAZODONE HYDROCHLORIDE 50 MG/1
25-75 TABLET, FILM COATED ORAL AT BEDTIME
Qty: 40 TABLET | Refills: 1 | Status: SHIPPED | OUTPATIENT
Start: 2022-05-17 | End: 2022-10-28

## 2022-05-17 NOTE — TELEPHONE ENCOUNTER
CW,      Please see [employer] message.  Last Rx for Trazodone 4/19/21    Do you want to do some type of visit?  Sharmaine Lopez RN    
See message back to patient- thanks- CW   
Home

## 2022-05-24 ENCOUNTER — IMMUNIZATION (OUTPATIENT)
Dept: NURSING | Facility: CLINIC | Age: 52
End: 2022-05-24
Payer: COMMERCIAL

## 2022-05-24 PROCEDURE — 0064A COVID-19,PF,MODERNA (18+ YRS BOOSTER .25ML): CPT

## 2022-05-24 PROCEDURE — 91306 COVID-19,PF,MODERNA (18+ YRS BOOSTER .25ML): CPT

## 2022-08-19 ENCOUNTER — MYC MEDICAL ADVICE (OUTPATIENT)
Dept: FAMILY MEDICINE | Facility: CLINIC | Age: 52
End: 2022-08-19

## 2022-09-29 ENCOUNTER — IMMUNIZATION (OUTPATIENT)
Dept: NURSING | Facility: CLINIC | Age: 52
End: 2022-09-29
Payer: COMMERCIAL

## 2022-09-29 PROCEDURE — 0124A COVID-19,PF,PFIZER BOOSTER BIVALENT: CPT

## 2022-09-29 PROCEDURE — 91312 COVID-19,PF,PFIZER BOOSTER BIVALENT: CPT

## 2022-09-29 PROCEDURE — 90471 IMMUNIZATION ADMIN: CPT

## 2022-09-29 PROCEDURE — 90682 RIV4 VACC RECOMBINANT DNA IM: CPT

## 2022-11-16 ENCOUNTER — TRANSFERRED RECORDS (OUTPATIENT)
Dept: HEALTH INFORMATION MANAGEMENT | Facility: CLINIC | Age: 52
End: 2022-11-16

## 2023-01-12 ENCOUNTER — OFFICE VISIT (OUTPATIENT)
Dept: FAMILY MEDICINE | Facility: CLINIC | Age: 53
End: 2023-01-12
Payer: COMMERCIAL

## 2023-01-12 VITALS
HEIGHT: 71 IN | SYSTOLIC BLOOD PRESSURE: 135 MMHG | OXYGEN SATURATION: 98 % | TEMPERATURE: 97.1 F | WEIGHT: 165 LBS | RESPIRATION RATE: 16 BRPM | DIASTOLIC BLOOD PRESSURE: 85 MMHG | BODY MASS INDEX: 23.1 KG/M2 | HEART RATE: 66 BPM

## 2023-01-12 DIAGNOSIS — T75.3XXA MOTION SICKNESS, INITIAL ENCOUNTER: Primary | ICD-10-CM

## 2023-01-12 PROCEDURE — 99213 OFFICE O/P EST LOW 20 MIN: CPT | Performed by: FAMILY MEDICINE

## 2023-01-12 RX ORDER — SCOLOPAMINE TRANSDERMAL SYSTEM 1 MG/1
1 PATCH, EXTENDED RELEASE TRANSDERMAL
Qty: 4 PATCH | Refills: 1 | Status: SHIPPED | OUTPATIENT
Start: 2023-01-12 | End: 2023-05-23

## 2023-01-12 ASSESSMENT — PAIN SCALES - GENERAL: PAINLEVEL: NO PAIN (0)

## 2023-01-12 NOTE — PATIENT INSTRUCTIONS
Try to stay toward middle of ship and on lower decks.  Try to focus on horizon or a steady object.  Try to avoid screens or reading.

## 2023-01-12 NOTE — PROGRESS NOTES
"Assessment/Plan    At risk for motion sickness.  We discussed environmental modification; see patient instructions.  Patient is interested in trying a scopolamine patch, so this was prescribed.  We discussed potential adverse effects, including urinary retention and sedation.  Patient does not have a history of glaucoma.  He will also bring some as needed dimenhydrinate, just in case he does not tolerate scopolamine.    Orders Placed This Encounter   Procedures     REVIEW OF HEALTH MAINTENANCE PROTOCOL ORDERS       ----  Chief complaint: Consult (For seasick prevention while on cruise)    Patient would like to discuss motion sickness prevention.  He will be going on a cruise to [place] in February.  He recalls an episode of severe motion sickness on a daily cruise in [place].  He states that he rarely experiences motion sickness in a car.    Exam  /85 (BP Location: Right arm, Patient Position: Sitting, Cuff Size: Adult Regular)   Pulse 66   Temp 97.1  F (36.2  C) (Temporal)   Resp 16   Ht 1.81 m (5' 11.26\")   Wt 74.8 kg (165 lb)   SpO2 98%   BMI 22.85 kg/m      "

## 2023-04-16 ENCOUNTER — HEALTH MAINTENANCE LETTER (OUTPATIENT)
Age: 53
End: 2023-04-16

## 2023-05-16 ASSESSMENT — ENCOUNTER SYMPTOMS
COUGH: 0
FEVER: 0
ABDOMINAL PAIN: 0
JOINT SWELLING: 0
SHORTNESS OF BREATH: 0
EYE PAIN: 0
HEMATOCHEZIA: 0
DYSURIA: 0
FREQUENCY: 0
HEARTBURN: 0
PALPITATIONS: 0
NAUSEA: 0
MYALGIAS: 0
HEADACHES: 0
CHILLS: 0
SORE THROAT: 0
NERVOUS/ANXIOUS: 0
PARESTHESIAS: 0
CONSTIPATION: 0
DIARRHEA: 0
DIZZINESS: 0
HEMATURIA: 0
ARTHRALGIAS: 0
WEAKNESS: 0

## 2023-05-23 ENCOUNTER — OFFICE VISIT (OUTPATIENT)
Dept: FAMILY MEDICINE | Facility: CLINIC | Age: 53
End: 2023-05-23
Payer: COMMERCIAL

## 2023-05-23 VITALS
RESPIRATION RATE: 14 BRPM | HEIGHT: 71 IN | TEMPERATURE: 97.3 F | OXYGEN SATURATION: 100 % | BODY MASS INDEX: 22.05 KG/M2 | DIASTOLIC BLOOD PRESSURE: 79 MMHG | WEIGHT: 157.5 LBS | HEART RATE: 54 BPM | SYSTOLIC BLOOD PRESSURE: 129 MMHG

## 2023-05-23 DIAGNOSIS — L21.9 SEBORRHEIC DERMATITIS: ICD-10-CM

## 2023-05-23 DIAGNOSIS — E78.5 HYPERLIPIDEMIA LDL GOAL <160: ICD-10-CM

## 2023-05-23 DIAGNOSIS — T75.3XXA MOTION SICKNESS, INITIAL ENCOUNTER: ICD-10-CM

## 2023-05-23 DIAGNOSIS — G47.09 OTHER INSOMNIA: ICD-10-CM

## 2023-05-23 DIAGNOSIS — Z00.00 ROUTINE GENERAL MEDICAL EXAMINATION AT A HEALTH CARE FACILITY: Primary | ICD-10-CM

## 2023-05-23 DIAGNOSIS — B00.9 HERPES SIMPLEX VIRUS (HSV) INFECTION: ICD-10-CM

## 2023-05-23 LAB
ALBUMIN SERPL BCG-MCNC: 4.7 G/DL (ref 3.5–5.2)
ALP SERPL-CCNC: 63 U/L (ref 40–129)
ALT SERPL W P-5'-P-CCNC: 15 U/L (ref 10–50)
ANION GAP SERPL CALCULATED.3IONS-SCNC: 12 MMOL/L (ref 7–15)
AST SERPL W P-5'-P-CCNC: 25 U/L (ref 10–50)
BILIRUB SERPL-MCNC: 0.5 MG/DL
BUN SERPL-MCNC: 11.2 MG/DL (ref 6–20)
CALCIUM SERPL-MCNC: 9.1 MG/DL (ref 8.6–10)
CHLORIDE SERPL-SCNC: 99 MMOL/L (ref 98–107)
CHOLEST SERPL-MCNC: 222 MG/DL
CREAT SERPL-MCNC: 0.78 MG/DL (ref 0.67–1.17)
DEPRECATED HCO3 PLAS-SCNC: 25 MMOL/L (ref 22–29)
GFR SERPL CREATININE-BSD FRML MDRD: >90 ML/MIN/1.73M2
GLUCOSE SERPL-MCNC: 90 MG/DL (ref 70–99)
HDLC SERPL-MCNC: 71 MG/DL
LDLC SERPL CALC-MCNC: 139 MG/DL
NONHDLC SERPL-MCNC: 151 MG/DL
POTASSIUM SERPL-SCNC: 4 MMOL/L (ref 3.4–5.3)
PROT SERPL-MCNC: 7.5 G/DL (ref 6.4–8.3)
PSA SERPL DL<=0.01 NG/ML-MCNC: 1.07 NG/ML (ref 0–3.5)
SODIUM SERPL-SCNC: 136 MMOL/L (ref 136–145)
TRIGL SERPL-MCNC: 62 MG/DL

## 2023-05-23 PROCEDURE — 99396 PREV VISIT EST AGE 40-64: CPT | Performed by: FAMILY MEDICINE

## 2023-05-23 PROCEDURE — G0103 PSA SCREENING: HCPCS | Performed by: FAMILY MEDICINE

## 2023-05-23 PROCEDURE — 36415 COLL VENOUS BLD VENIPUNCTURE: CPT | Performed by: FAMILY MEDICINE

## 2023-05-23 PROCEDURE — 80061 LIPID PANEL: CPT | Performed by: FAMILY MEDICINE

## 2023-05-23 PROCEDURE — 80053 COMPREHEN METABOLIC PANEL: CPT | Performed by: FAMILY MEDICINE

## 2023-05-23 RX ORDER — TRIAMCINOLONE ACETONIDE 1 MG/G
OINTMENT TOPICAL
Qty: 15 G | Refills: 1 | Status: SHIPPED | OUTPATIENT
Start: 2023-05-23 | End: 2024-08-27

## 2023-05-23 RX ORDER — VALACYCLOVIR HYDROCHLORIDE 1 G/1
2000 TABLET, FILM COATED ORAL 2 TIMES DAILY
Qty: 16 TABLET | Refills: 1 | Status: SHIPPED | OUTPATIENT
Start: 2023-05-23 | End: 2024-07-01

## 2023-05-23 RX ORDER — EMTRICITABINE AND TENOFOVIR ALAFENAMIDE 200; 25 MG/1; MG/1
1 TABLET ORAL
COMMUNITY
Start: 2023-05-16 | End: 2023-07-15

## 2023-05-23 RX ORDER — TRAZODONE HYDROCHLORIDE 50 MG/1
TABLET, FILM COATED ORAL
Qty: 40 TABLET | Refills: 3 | Status: SHIPPED | OUTPATIENT
Start: 2023-05-23 | End: 2024-06-11

## 2023-05-23 RX ORDER — SCOLOPAMINE TRANSDERMAL SYSTEM 1 MG/1
1 PATCH, EXTENDED RELEASE TRANSDERMAL
Qty: 3 PATCH | Refills: 1 | Status: SHIPPED | OUTPATIENT
Start: 2023-05-23 | End: 2024-07-29

## 2023-05-23 ASSESSMENT — ENCOUNTER SYMPTOMS
EYE PAIN: 0
ARTHRALGIAS: 0
NERVOUS/ANXIOUS: 0
MYALGIAS: 0
CONSTIPATION: 0
HEARTBURN: 0
JOINT SWELLING: 0
HEMATURIA: 0
PALPITATIONS: 0
FEVER: 0
PARESTHESIAS: 0
HEMATOCHEZIA: 0
SHORTNESS OF BREATH: 0
DIZZINESS: 0
FREQUENCY: 0
COUGH: 0
SORE THROAT: 0
WEAKNESS: 0
NAUSEA: 0
DIARRHEA: 0
HEADACHES: 0
DYSURIA: 0
ABDOMINAL PAIN: 0
CHILLS: 0

## 2023-05-23 ASSESSMENT — PAIN SCALES - GENERAL: PAINLEVEL: NO PAIN (0)

## 2023-05-23 NOTE — PROGRESS NOTES
SUBJECTIVE:   CC: Reji is an 53 year old who presents for preventative health visit.        View : No data to display.            Patient has been advised of split billing requirements and indicates understanding: Yes     Healthy Habits:     Getting at least 3 servings of Calcium per day:  Yes    Bi-annual eye exam:  Yes    Dental care twice a year:  Yes    Sleep apnea or symptoms of sleep apnea:  None    Diet:  Regular (no restrictions)    Frequency of exercise:  6-7 days/week    Duration of exercise:  45-60 minutes    Taking medications regularly:  Yes    Medication side effects:  Not applicable    PHQ-2 Total Score: 0    Additional concerns today:  No    Started Prep, though [employer]-  Started descovy 200/25- monthly fills per insurance issue.  Goes in monthly for now, then [id number].  STD testing through them.  Had been locked down s/p COVID, now seeing more people again.    Shingles- got shingles on R low back, during COVID.    Shingrix 4/22 and 6/22.  UTD on all other vaccines as well.    Insomnia-   Trazodone ~1 tab once weekly. Minimal se's.  Mostly if traveling or if perseverating.  Otherwise, melatonin 5mg~3 nights/wk.    HSV- hints at cold sore, so takes the valtrex maybe q6 months, no outbreaks for awhile.    Seb derm  Triamcin- for flares, but rec zinc/lotrimin as well.    Skin lesion on chest-   Round spot on chest, not large, itches.  Comes/goes, does respond to triamcin.    Motion sickness-  Used scopolamine patch for his first cruise.  Has a 7-day cruise coming up.    New job-   [employer]-   Fam trips to [employer], pre-covid.  Now doing cruises- better commissions.    [employer], now [employer]- [employer].    Exercise- yoga studio down the street- newer, 2 months ago.  Getting 10,000 steps/wk.        Social History     Tobacco Use     Smoking status: Never     Smokeless tobacco: Never   Vaping Use     Vaping status: Never Used   Substance Use Topics     Alcohol use: Yes      Comment: ~6 drinks/wk             5/16/2023     8:37 PM   Alcohol Use   Prescreen: >3 drinks/day or >7 drinks/week? No          View : No data to display.                Last PSA:   PSA   Date Value Ref Range Status   10/28/2020 1.16 0 - 4 ug/L Final     Comment:     Assay Method:  Chemiluminescence using Siemens Vista analyzer     Prostate Specific Antigen Screen   Date Value Ref Range Status   12/15/2021 1.14 0.00 - 4.00 ug/L Final       Reviewed orders with patient. Reviewed health maintenance and updated orders accordingly - Yes      Lab work is in process  Labs reviewed in EPIC  BP Readings from Last 3 Encounters:   05/23/23 129/79   01/12/23 135/85   12/27/21 (!) 160/87    Wt Readings from Last 3 Encounters:   05/23/23 71.4 kg (157 lb 8 oz)   01/12/23 74.8 kg (165 lb)   12/27/21 73 kg (161 lb)                  Patient Active Problem List   Diagnosis     Family history of malignant neoplasm of prostate     HYPERLIPIDEMIA LDL GOAL <160     Herpes simplex virus (HSV) infection     Insomnia     Bilateral low back pain with sciatica, sciatica laterality unspecified     At risk for HIV due to homosexual contact     Past Surgical History:   Procedure Laterality Date     COLONOSCOPY N/A 7/19/2018    Procedure: COLONOSCOPY;  colonoscopy;  Surgeon: Sha Hooker MD;  Location: [facility]     HERNIA REPAIR, INGUINAL RT/LT  2004    bilateral R>L     ZZC NONSPECIFIC PROCEDURE  Age 3    Cystectomy from eyelid       Social History     Tobacco Use     Smoking status: Never     Smokeless tobacco: Never   Vaping Use     Vaping status: Never Used   Substance Use Topics     Alcohol use: Yes     Comment: ~6 drinks/wk     Family History   Problem Relation Age of Onset     Hypertension Mother      Breast Cancer Mother         lumpectomy/radiation, dx at 71, another lumpectomy at 75     Prostate Cancer Father         Dx'd age 55-60 approx, did well after txt, no issues     Cancer Father 48        esophageal cancer, partner smoked,  some etoh     Allergies Sister      Lipids Brother         visual changes, plaque found in eye, on statins since his 40s     Deep Vein Thrombosis Brother         has been on coumadin     C.A.D. No family hx of      Cerebrovascular Disease No family hx of          Current Outpatient Medications   Medication Sig Dispense Refill     scopolamine (TRANSDERM) 1 MG/3DAYS 72 hr patch Place 1 patch onto the skin every 72 hours 3 patch 1     traZODone (DESYREL) 50 MG tablet Take 1/2-1 tab at bedtime as needed 40 tablet 3     triamcinolone (KENALOG) 0.1 % external ointment Apply sparingly to affected area two times daily as needed 15 g 1     valACYclovir (VALTREX) 1000 mg tablet Take 2 tablets (2,000 mg) by mouth 2 times daily 16 tablet 1     Cholecalciferol (VITAMIN D) 1000 UNITS capsule Take 1 capsule by mouth daily       DESCOVY 200-25 MG per tablet Take 1 tablet by mouth daily at 2 pm       MELATONIN PO Take 3 mg by mouth nightly as needed       Omega-3 Fatty Acids (FISH OIL PO)        Allergies   Allergen Reactions     Amoxicillin      Turned Red     Seasonal Allergies      Recent Labs   Lab Test 12/15/21  0742 10/28/20  0837 09/20/19  0840   * 149* 132*   HDL 83 67 68   TRIG 83 95 82   CR 0.76 0.73  --    GFRESTIMATED >90 >90  --    GFRESTBLACK  --  >90  --    POTASSIUM 3.9 4.0  --         Reviewed and updated as needed this visit by clinical staff   Tobacco  Allergies  Meds  Problems  Med Hx  Surg Hx  Fam Hx          Reviewed and updated as needed this visit by Provider   Tobacco  Allergies  Meds  Problems  Med Hx  Surg Hx  Fam Hx             Review of Systems   Constitutional: Negative for chills and fever.   HENT: Negative for congestion, ear pain, hearing loss and sore throat.    Eyes: Negative for pain and visual disturbance.   Respiratory: Negative for cough and shortness of breath.    Cardiovascular: Negative for chest pain, palpitations and peripheral edema.   Gastrointestinal: Negative for  "abdominal pain, constipation, diarrhea, heartburn, hematochezia and nausea.   Genitourinary: Negative for dysuria, frequency, genital sores, hematuria, impotence, penile discharge and urgency.   Musculoskeletal: Negative for arthralgias, joint swelling and myalgias.   Skin: Negative for rash.   Neurological: Negative for dizziness, weakness, headaches and paresthesias.   Psychiatric/Behavioral: Negative for mood changes. The patient is not nervous/anxious.        OBJECTIVE:   /79   Pulse 54   Temp 97.3  F (36.3  C) (Temporal)   Resp 14   Ht 1.81 m (5' 11.26\")   Wt 71.4 kg (157 lb 8 oz)   SpO2 100%   BMI 21.81 kg/m      Physical Exam  GENERAL: healthy, alert and no distress  EYES: Eyes grossly normal to inspection, PERRL and conjunctivae and sclerae normal  HENT: ear canals and TM's normal, nose and mouth without ulcers or lesions  NECK: no adenopathy, no asymmetry, masses, or scars and thyroid normal to palpation  RESP: lungs clear to auscultation - no rales, rhonchi or wheezes  CV: regular rate and rhythm, normal S1 S2, no S3 or S4, no murmur, click or rub, no peripheral edema and peripheral pulses strong  ABDOMEN: soft, nontender, no hepatosplenomegaly, no masses and bowel sounds normal  MS: no gross musculoskeletal defects noted, no edema  SKIN: no suspicious lesions or rashes  NEURO: Normal strength and tone, mentation intact and speech normal  PSYCH: mentation appears normal, affect normal/bright    Diagnostic Test Results:  Labs reviewed in Epic    ASSESSMENT/PLAN:       ICD-10-CM    1. Routine general medical examination at a health care facility  Z00.00 PSA, screen     PSA, screen      2. Seborrheic dermatitis  L21.9 triamcinolone (KENALOG) 0.1 % external ointment      3. Other insomnia  G47.09 traZODone (DESYREL) 50 MG tablet      4. Motion sickness, initial encounter  T75.3XXA scopolamine (TRANSDERM) 1 MG/3DAYS 72 hr patch      5. Hyperlipidemia LDL goal <160  E78.5 Lipid panel reflex to " direct LDL Fasting     Lipid panel reflex to direct LDL Fasting      6. Herpes simplex virus (HSV) infection  B00.9 valACYclovir (VALTREX) 1000 mg tablet     Comprehensive metabolic panel (BMP + Alb, Alk Phos, ALT, AST, Total. Bili, TP)     Comprehensive metabolic panel (BMP + Alb, Alk Phos, ALT, AST, Total. Bili, TP)      7. At risk for HIV due to homosexual contact  Z91.89         CPE- Reviewed chronic issues and medications/supplements.   Discussed healthy habits, eye/dental care, healthcare maintenance issues, including cancer screenings (colonoscopies, PSA), relevant immunizations, and cardiac risk factor screenings such as for cholesterol, HTN, and DM.  See orders for tests and screening needed- fasting today.      No immunizations needed today.      Skin - Seb derm likely around nose, rec zinc via Head/Shoulders for maintenance, and triamcinolone/lotrimin for flares.  Triamcin refills sent.  Round itchy lesion on sternal area, not visible today, possible tinea.  Rec txt with lotrimin 2x/day for ~2 wks, if not helpful, treat with triamcinolone.    Insomnia- using melatonin 5mg 2-3x/wk,and trazodone 50mg about once weekly.  Rec trial of lower melatonin dose. Refills sent.    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  Scopolamine patch worked well for first cruise, no se's, refills sent.    Lipids- started back doing yoga 2x/wk a couple months ago, still walking 10,000 steps/day, lost some weight, anticipates his lipids will be better again.  Will recheck today.    HSV- valtrex working well, taking conservatively (~q6mo), no bad outbreaks.    Prep- started prep through [facility] project recently, currently seeing them monthly, then will go to q3mo.      Patient has been advised of split billing requirements and indicates understanding: Yes      COUNSELING:   Reviewed preventive health counseling, as reflected in patient instructions        He reports that he  has never smoked. He has never used smokeless tobacco.        Veronica Sampson MD  [facility]

## 2023-05-29 NOTE — RESULT ENCOUNTER NOTE
-Your cholesterol panel looks good/better with a lower LDL (the bad cholesterol) and a good/high HDL (the good cholesterol).   -Your CMP (which includes electrolyte levels, blood sugar levels, and kidney and liver function tests) looks normal.  -Your PSA (prostate cancer screening lab) was in the normal range and stable.    Please let me know if you have any questions.  Best,   Hugh Sampson MD

## 2023-10-23 ENCOUNTER — IMMUNIZATION (OUTPATIENT)
Dept: NURSING | Facility: CLINIC | Age: 53
End: 2023-10-23
Payer: COMMERCIAL

## 2023-10-23 PROCEDURE — 90471 IMMUNIZATION ADMIN: CPT

## 2023-10-23 PROCEDURE — 90682 RIV4 VACC RECOMBINANT DNA IM: CPT

## 2023-10-23 PROCEDURE — 91320 SARSCV2 VAC 30MCG TRS-SUC IM: CPT

## 2023-10-23 PROCEDURE — 90480 ADMN SARSCOV2 VAC 1/ONLY CMP: CPT

## 2024-03-17 ENCOUNTER — MYC MEDICAL ADVICE (OUTPATIENT)
Dept: FAMILY MEDICINE | Facility: CLINIC | Age: 54
End: 2024-03-17
Payer: COMMERCIAL

## 2024-03-18 NOTE — TELEPHONE ENCOUNTER
Left message for patient to call [facility] back  See Goldie  When patient calls back please transfer to an RN      Jayden HOUSTON RN

## 2024-03-19 ENCOUNTER — TRANSFERRED RECORDS (OUTPATIENT)
Dept: HEALTH INFORMATION MANAGEMENT | Facility: CLINIC | Age: 54
End: 2024-03-19
Payer: COMMERCIAL

## 2024-06-09 DIAGNOSIS — G47.09 OTHER INSOMNIA: ICD-10-CM

## 2024-06-11 RX ORDER — TRAZODONE HYDROCHLORIDE 50 MG/1
TABLET, FILM COATED ORAL
Qty: 40 TABLET | Refills: 1 | Status: SHIPPED | OUTPATIENT
Start: 2024-06-11

## 2024-06-18 ENCOUNTER — E-VISIT (OUTPATIENT)
Dept: URGENT CARE | Facility: CLINIC | Age: 54
End: 2024-06-18
Payer: COMMERCIAL

## 2024-06-18 DIAGNOSIS — J30.89 OTHER ALLERGIC RHINITIS: ICD-10-CM

## 2024-06-18 DIAGNOSIS — J01.90 ACUTE SINUSITIS WITH SYMPTOMS > 10 DAYS: Primary | ICD-10-CM

## 2024-06-18 PROCEDURE — 99421 OL DIG E/M SVC 5-10 MIN: CPT | Performed by: FAMILY MEDICINE

## 2024-06-18 RX ORDER — IPRATROPIUM BROMIDE 42 UG/1
2 SPRAY, METERED NASAL 4 TIMES DAILY
Qty: 15 ML | Refills: 0 | Status: SHIPPED | OUTPATIENT
Start: 2024-06-18 | End: 2024-08-27

## 2024-06-18 RX ORDER — CEFDINIR 300 MG/1
600 CAPSULE ORAL DAILY
Qty: 14 CAPSULE | Refills: 0 | Status: SHIPPED | OUTPATIENT
Start: 2024-06-18 | End: 2024-06-25

## 2024-06-18 NOTE — PATIENT INSTRUCTIONS
Acute Sinusitis: Care Instructions  Overview     Acute sinusitis is an inflammation of the mucous membranes inside the nose and sinuses. Sinuses are the hollow spaces in your skull around the eyes and nose. Acute sinusitis often follows a cold. Acute sinusitis causes thick, discolored mucus that drains from the nose or down the back of the throat. It also can cause pain and pressure in your head and face along with a stuffy or blocked nose.  In most cases, sinusitis gets better on its own in 1 to 2 weeks. But some mild symptoms may last for several weeks. Sometimes antibiotics are needed if there is a bacterial infection.  Follow-up care is a key part of your treatment and safety. Be sure to make and go to all appointments, and call your doctor if you are having problems. It's also a good idea to know your test results and keep a list of the medicines you take.  How can you care for yourself at home?  Use saline (saltwater) nasal washes. This can help keep your nasal passages open and wash out mucus and allergens.  You can buy saline nose washes at a grocery store or drugstore. Follow the instructions on the package.  You can make your own at home. Add 1 teaspoon of non-iodized salt and 1 teaspoon of baking soda to 2 cups of distilled or boiled and cooled water. Fill a squeeze bottle or a nasal cleansing pot (such as a neti pot) with the nasal wash. Then put the tip into your nostril, and lean over the sink. With your mouth open, gently squirt the liquid. Repeat on the other side.  Try a decongestant nasal spray like oxymetazoline (Afrin). Do not use it for more than 3 days in a row. Using it for more than 3 days can make your congestion worse.  If needed, take an over-the-counter pain medicine, such as acetaminophen (Tylenol), ibuprofen (Advil, Motrin), or naproxen (Aleve). Read and follow all instructions on the label.  If the doctor prescribed antibiotics, take them as directed. Do not stop taking them just  "because you feel better. You need to take the full course of antibiotics.  Be careful when taking over-the-counter cold or flu medicines and Tylenol at the same time. Many of these medicines have acetaminophen, which is Tylenol. Read the labels to make sure that you are not taking more than the recommended dose. Too much acetaminophen (Tylenol) can be harmful.  Try a steroid nasal spray. It may help with your symptoms.  Breathe warm, moist air. You can use a steamy shower, a hot bath, or a sink filled with hot water. Avoid cold, dry air. Using a humidifier in your home may help. Follow the directions for cleaning the machine.  When should you call for help?   Call your doctor now or seek immediate medical care if:    You have new or worse swelling, redness, or pain in your face or around one or both of your eyes.     You have double vision or a change in your vision.     You have a high fever.     You have a severe headache and a stiff neck.     You have mental changes, such as feeling confused or much less alert.   Watch closely for changes in your health, and be sure to contact your doctor if:    You are not getting better as expected.   Where can you learn more?  Go to https://www.[employer].net/patiented  Enter I933 in the search box to learn more about \"Acute Sinusitis: Care Instructions.\"  Current as of: September 27, 2023               Content Version: 14.0    3627-5035 [employer].   Care instructions adapted under license by your healthcare professional. If you have questions about a medical condition or this instruction, always ask your healthcare professional. [employer] disclaims any warranty or liability for your use of this information.      You may want to try a nasal lavage (also known as nasal irrigation). You can find over-the-counter products, such as Neti-Pot, at retail locations or make your own at home. Instructions for homemade nasal lavage and more information on " the process are available online at http://www.aafp.org/afp/2009/1115/p1121.html.    Dear Reji Lamb    After reviewing your responses, I've been able to diagnose you sinus infection from allergies.      Take home Instructions:         1.  Plenty of fluids, rest, warm compresses on face  2.  Mucinex twice daily for at least 4 days  3.  Teri Pot or Arthur Med 1x in the morning 1x at night (SALINE MIST SPRAY IS ACCEPTABLE, THOUGH NOT AS EFFECTIVE REPLACEMENT)  4.  Either Claritin (Loratadine), Allegra (Fexofenadine), or Zyrtec (Cetirizine) in the day  5.  Flonase (Fluticasone) or Astelin 2x each nostril twice a day for two weeks, then once each nostril once a day  6. Take antibiotic as directed if prescribed. Take daily probiotic (ex. Culturelle) and yogurt (ex. Activia or greek yogurt) while on antibiotic.             If your symptoms worsen, you develop severe headache, vomiting, high fever (>102), or are not improving in 7 days, please contact your primary care provider for an appointment or visit any of our convenient Walk-in Care or Urgent Care Centers to be seen which can be found on our website?here.?     Thanks again for choosing?us?as your health care partner,?   ?  MAHI JORDAN CNP?

## 2024-07-01 ENCOUNTER — MYC MEDICAL ADVICE (OUTPATIENT)
Dept: FAMILY MEDICINE | Facility: CLINIC | Age: 54
End: 2024-07-01
Payer: COMMERCIAL

## 2024-07-01 DIAGNOSIS — B00.9 HERPES SIMPLEX VIRUS (HSV) INFECTION: ICD-10-CM

## 2024-07-01 RX ORDER — VALACYCLOVIR HYDROCHLORIDE 1 G/1
2000 TABLET, FILM COATED ORAL 2 TIMES DAILY
Qty: 16 TABLET | Refills: 1 | Status: SHIPPED | OUTPATIENT
Start: 2024-07-01

## 2024-07-21 SDOH — HEALTH STABILITY: PHYSICAL HEALTH: ON AVERAGE, HOW MANY MINUTES DO YOU ENGAGE IN EXERCISE AT THIS LEVEL?: 60 MIN

## 2024-07-21 SDOH — HEALTH STABILITY: PHYSICAL HEALTH: ON AVERAGE, HOW MANY DAYS PER WEEK DO YOU ENGAGE IN MODERATE TO STRENUOUS EXERCISE (LIKE A BRISK WALK)?: 6 DAYS

## 2024-07-21 ASSESSMENT — SOCIAL DETERMINANTS OF HEALTH (SDOH): HOW OFTEN DO YOU GET TOGETHER WITH FRIENDS OR RELATIVES?: TWICE A WEEK

## 2024-07-24 ENCOUNTER — OFFICE VISIT (OUTPATIENT)
Dept: FAMILY MEDICINE | Facility: CLINIC | Age: 54
End: 2024-07-24
Payer: COMMERCIAL

## 2024-07-24 VITALS
DIASTOLIC BLOOD PRESSURE: 80 MMHG | WEIGHT: 153.1 LBS | SYSTOLIC BLOOD PRESSURE: 136 MMHG | OXYGEN SATURATION: 99 % | TEMPERATURE: 97.2 F | BODY MASS INDEX: 20.74 KG/M2 | HEART RATE: 67 BPM | RESPIRATION RATE: 16 BRPM | HEIGHT: 72 IN

## 2024-07-24 DIAGNOSIS — E78.5 HYPERLIPIDEMIA LDL GOAL <160: ICD-10-CM

## 2024-07-24 DIAGNOSIS — G47.09 OTHER INSOMNIA: ICD-10-CM

## 2024-07-24 DIAGNOSIS — Z00.00 ROUTINE GENERAL MEDICAL EXAMINATION AT A HEALTH CARE FACILITY: Primary | ICD-10-CM

## 2024-07-24 DIAGNOSIS — Z12.5 SCREENING FOR PROSTATE CANCER: ICD-10-CM

## 2024-07-24 DIAGNOSIS — B00.9 HERPES SIMPLEX VIRUS (HSV) INFECTION: ICD-10-CM

## 2024-07-24 PROBLEM — Z72.51 HIGH RISK SEXUAL BEHAVIOR, UNSPECIFIED TYPE: Status: ACTIVE | Noted: 2024-07-24

## 2024-07-24 LAB
ALBUMIN SERPL BCG-MCNC: 4.7 G/DL (ref 3.5–5.2)
ALP SERPL-CCNC: 77 U/L (ref 40–150)
ALT SERPL W P-5'-P-CCNC: 13 U/L (ref 0–70)
ANION GAP SERPL CALCULATED.3IONS-SCNC: 9 MMOL/L (ref 7–15)
AST SERPL W P-5'-P-CCNC: 23 U/L (ref 0–45)
BILIRUB SERPL-MCNC: 0.4 MG/DL
BUN SERPL-MCNC: 10.5 MG/DL (ref 6–20)
CALCIUM SERPL-MCNC: 9.3 MG/DL (ref 8.8–10.4)
CHLORIDE SERPL-SCNC: 100 MMOL/L (ref 98–107)
CHOLEST SERPL-MCNC: 194 MG/DL
CREAT SERPL-MCNC: 0.81 MG/DL (ref 0.67–1.17)
EGFRCR SERPLBLD CKD-EPI 2021: >90 ML/MIN/1.73M2
FASTING STATUS PATIENT QL REPORTED: YES
FASTING STATUS PATIENT QL REPORTED: YES
GLUCOSE SERPL-MCNC: 78 MG/DL (ref 70–99)
HCO3 SERPL-SCNC: 27 MMOL/L (ref 22–29)
HDLC SERPL-MCNC: 69 MG/DL
LDLC SERPL CALC-MCNC: 112 MG/DL
NONHDLC SERPL-MCNC: 125 MG/DL
POTASSIUM SERPL-SCNC: 4.6 MMOL/L (ref 3.4–5.3)
PROT SERPL-MCNC: 7.3 G/DL (ref 6.4–8.3)
PSA SERPL DL<=0.01 NG/ML-MCNC: 1.15 NG/ML (ref 0–3.5)
SODIUM SERPL-SCNC: 136 MMOL/L (ref 135–145)
TRIGL SERPL-MCNC: 67 MG/DL

## 2024-07-24 PROCEDURE — 80053 COMPREHEN METABOLIC PANEL: CPT | Performed by: FAMILY MEDICINE

## 2024-07-24 PROCEDURE — 80061 LIPID PANEL: CPT | Performed by: FAMILY MEDICINE

## 2024-07-24 PROCEDURE — 36415 COLL VENOUS BLD VENIPUNCTURE: CPT | Performed by: FAMILY MEDICINE

## 2024-07-24 PROCEDURE — G0103 PSA SCREENING: HCPCS | Performed by: FAMILY MEDICINE

## 2024-07-24 PROCEDURE — 99396 PREV VISIT EST AGE 40-64: CPT | Performed by: FAMILY MEDICINE

## 2024-07-24 RX ORDER — EMTRICITABINE AND TENOFOVIR DISOPROXIL FUMARATE 200; 300 MG/1; MG/1
1 TABLET, FILM COATED ORAL DAILY
COMMUNITY

## 2024-07-24 ASSESSMENT — PAIN SCALES - GENERAL: PAINLEVEL: NO PAIN (0)

## 2024-07-24 NOTE — PATIENT INSTRUCTIONS
Patient Education   Preventive Care Advice   This is general advice given by our system to help you stay healthy. However, your care team may have specific advice just for you. Please talk to your care team about your preventive care needs.  Nutrition  Eat 5 or more servings of fruits and vegetables each day.  Try wheat bread, brown rice and whole grain pasta (instead of white bread, rice, and pasta).  Get enough calcium and vitamin D. Check the label on foods and aim for 100% of the RDA (recommended daily allowance).  Lifestyle  Exercise at least 150 minutes each week  (30 minutes a day, 5 days a week).  Do muscle strengthening activities 2 days a week. These help control your weight and prevent disease.  No smoking.  Wear sunscreen to prevent skin cancer.  Have a dental exam and cleaning every 6 months.  Yearly exams  See your health care team every year to talk about:  Any changes in your health.  Any medicines your care team has prescribed.  Preventive care, family planning, and ways to prevent chronic diseases.  Shots (vaccines)   HPV shots (up to age 26), if you've never had them before.  Hepatitis B shots (up to age 59), if you've never had them before.  COVID-19 shot: Get this shot when it's due.  Flu shot: Get a flu shot every year.  Tetanus shot: Get a tetanus shot every 10 years.  Pneumococcal, hepatitis A, and RSV shots: Ask your care team if you need these based on your risk.  Shingles shot (for age 50 and up)  General health tests  Diabetes screening:  Starting at age 35, Get screened for diabetes at least every 3 years.  If you are younger than age 35, ask your care team if you should be screened for diabetes.  Cholesterol test: At age 39, start having a cholesterol test every 5 years, or more often if advised.  Bone density scan (DEXA): At age 50, ask your care team if you should have this scan for osteoporosis (brittle bones).  Hepatitis C: Get tested at least once in your life.  STIs (sexually  transmitted infections)  Before age 24: Ask your care team if you should be screened for STIs.  After age 24: Get screened for STIs if you're at risk. You are at risk for STIs (including HIV) if:  You are sexually active with more than one person.  You don't use condoms every time.  You or a partner was diagnosed with a sexually transmitted infection.  If you are at risk for HIV, ask about PrEP medicine to prevent HIV.  Get tested for HIV at least once in your life, whether you are at risk for HIV or not.  Cancer screening tests  Cervical cancer screening: If you have a cervix, begin getting regular cervical cancer screening tests starting at age 21.  Breast cancer scan (mammogram): If you've ever had breasts, begin having regular mammograms starting at age 40. This is a scan to check for breast cancer.  Colon cancer screening: It is important to start screening for colon cancer at age 45.  Have a colonoscopy test every 10 years (or more often if you're at risk) Or, ask your provider about stool tests like a FIT test every year or Cologuard test every 3 years.  To learn more about your testing options, visit:   .  For help making a decision, visit:   https://bit.ly/jn95143.  Prostate cancer screening test: If you have a prostate, ask your care team if a prostate cancer screening test (PSA) at age 55 is right for you.  Lung cancer screening: If you are a current or former smoker ages 50 to 80, ask your care team if ongoing lung cancer screenings are right for you.  For informational purposes only. Not to replace the advice of your health care provider. Copyright   2023 [employer]. All rights reserved. Clinically reviewed by the [facility]itions Program. [employer] 247880 - REV 01/24.

## 2024-07-24 NOTE — PROGRESS NOTES
Preventive Care Visit  [facility]  Veronica Sampson MD, Family Medicine  Jul 24, 2024      Assessment & Plan     Routine general medical examination at a health care facility  Reviewed chronic issues and medications/supplements.   Discussed healthy habits, eye/dental care, healthcare maintenance issues, including cancer screenings (colonoscopies, PSA), relevant immunizations, and cardiac risk factor screenings such as for cholesterol, HTN, and DM.  See orders for tests and screening needed.    No immunizations needed today.        Hyperlipidemia LDL goal <160  Recheck fasting lipids today.  LDL was improved last year, and his exercise has improved since then (diet about the same).  - Comprehensive metabolic panel (BMP + Alb, Alk Phos, ALT, AST, Total. Bili, TP); Future  - Lipid panel reflex to direct LDL Fasting; Future    Herpes simplex virus (HSV) infection  More outbreaks lately, so needed early refill of valtrex, which works well. Has enough refills for awhile now.  - Comprehensive metabolic panel (BMP + Alb, Alk Phos, ALT, AST, Total. Bili, TP); Future    Other insomnia  Melatonin 3mg most nights, takes 1/2 tab trazodone on nights he wakes and doesn't feel like he'll be able to go back to sleep.  Has enough refills.    At Risk for HIV-  Following with [employer] project, switched from descovy to truvada last summer.  Just had one of the q3mo full STD screens.    Screening for prostate cancer  - PSA, screen; Future    Patient has been advised of split billing requirements and indicates understanding: Yes        Counseling  Appropriate preventive services were addressed with this patient via screening, questionnaire, or discussion as appropriate for fall prevention, nutrition, physical activity, Tobacco-use cessation, weight loss and cognition.  Checklist reviewing preventive services available has been given to the patient.  Reviewed patient's diet, addressing concerns and/or questions.       Follow-up Visit   Expected date:  Jul 24, 2025 (Approximate)      Follow Up Appointment Details:     Follow-up with whom?: PCP    Follow-Up for what?: Adult Preventive    How?: In Person                           Maury Mendoza is a 54 year old, presenting for the following:  Physical        7/24/2024     9:23 AM   Additional Questions   Roomed by [facility] Care Directive  Patient does not have a Health Care Directive or Living Will: Discussed advance care planning with patient; information given to patient to review.    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    Health-wise- doing well.    Did have a really bad sinus infection.  HA x 2 wks. Evisit- abx helped.    Shoulder/neck pain- went to [facility].  Xrays okay.  Dx impingement.  Did 8 wks of PT.  Much better, doing exercises couple times a week.  Work station okay.  Sit/stand desk.    Switched Prep to [employer]- discovy to truvada.  Q3mo-  Right down the street.  Just the fullness STD screen.    HSV- 3 outbreaks all of a sudden (2mo period) after about one a year.    Lipids-   Thinks it will be better, diet similar (really good at home, but restaurants the issue), exercise better.  Exercise - [employer] and [employer].  20 min wts w/ 5 minutes cardio, w  2x/wk.   Really intense  Keeps upping the wts.  Discount through [employer].    Insomnia- starts with melatonin 3mg- most nights.  If he wakes and can't get to sleep, will take 1/2 tab trazodone, ends up ~2x/wk.          7/21/2024   General Health   How would you rate your overall physical health? Excellent   Feel stress (tense, anxious, or unable to sleep) To some extent      (!) STRESS CONCERN      7/21/2024   Nutrition   Three or more servings of calcium each day? Yes   Diet: Regular (no restrictions)   How many servings of fruit and vegetables per day? 4 or more   How many  sweetened beverages each day? 0-1            7/21/2024   Exercise   Days per week of moderate/strenous exercise 6 days   Average minutes spent exercising at this level 60 min            7/21/2024   Social Factors   Frequency of gathering with friends or relatives Twice a week   Worry food won't last until get money to buy more No   Food not last or not have enough money for food? No   Do you have housing? (Housing is defined as stable permanent housing and does not include staying ouside in a car, in a tent, in an abandoned building, in an overnight shelter, or couch-surfing.) Yes   Are you worried about losing your housing? No   Lack of transportation? No   Unable to get utilities (heat,electricity)? No            7/21/2024   Fall Risk   Fallen 2 or more times in the past year? No   Trouble with walking or balance? No             7/21/2024   Dental   Dentist two times every year? Yes            7/21/2024   TB Screening   Were you born outside of the [place]? No            Today's PHQ-2 Score:       7/23/2024    11:42 AM   PHQ-2 ( 1999 Pfizer)   Q1: Little interest or pleasure in doing things 0   Q2: Feeling down, depressed or hopeless 0   PHQ-2 Score 0   Q1: Little interest or pleasure in doing things Not at all   Q2: Feeling down, depressed or hopeless Not at all   PHQ-2 Score 0           7/21/2024   Substance Use   Alcohol more than 3/day or more than 7/wk No   Do you use any other substances recreationally? No        Social History     Tobacco Use    Smoking status: Never    Smokeless tobacco: Never   Vaping Use    Vaping status: Never Used   Substance Use Topics    Alcohol use: Yes     Comment: ~6 drinks/wk    Drug use: No           7/21/2024   STI Screening   New sexual partner(s) since last STI/HIV test? No      Last PSA:   PSA   Date Value Ref Range Status   10/28/2020 1.16 0 - 4 ug/L Final     Comment:     Assay Method:  Chemiluminescence using Siemens Vista analyzer     Prostate Specific Antigen Screen   Date  "Value Ref Range Status   05/23/2023 1.07 0.00 - 3.50 ng/mL Final   12/15/2021 1.14 0.00 - 4.00 ug/L Final     ASCVD Risk   The 10-year ASCVD risk score (Jose BOWMAN, et al., 2019) is: 5.7%    Values used to calculate the score:      Age: 54 years      Sex: Male      Is Non- : No      Diabetic: No      Tobacco smoker: No      Systolic Blood Pressure: 136 mmHg      Is BP treated: No      HDL Cholesterol: 71 mg/dL      Total Cholesterol: 222 mg/dL           Reviewed and updated as needed this visit by Provider   Tobacco  Allergies  Meds  Problems  Med Hx  Surg Hx  Fam Hx            Lab work is in process  Labs reviewed in EPIC  BP Readings from Last 3 Encounters:   07/24/24 136/80   05/23/23 129/79   01/12/23 135/85    Wt Readings from Last 3 Encounters:   07/24/24 69.4 kg (153 lb 1.6 oz)   05/23/23 71.4 kg (157 lb 8 oz)   01/12/23 74.8 kg (165 lb)                      Review of Systems  Constitutional, neuro, ENT, endocrine, pulmonary, cardiac, gastrointestinal, genitourinary, musculoskeletal, integument and psychiatric systems are negative, except as otherwise noted.       Objective    Exam  /80   Pulse 67   Temp 97.2  F (36.2  C) (Temporal)   Resp 16   Ht 1.821 m (5' 11.69\")   Wt 69.4 kg (153 lb 1.6 oz)   SpO2 99%   BMI 20.94 kg/m     Estimated body mass index is 20.94 kg/m  as calculated from the following:    Height as of this encounter: 1.821 m (5' 11.69\").    Weight as of this encounter: 69.4 kg (153 lb 1.6 oz).    Physical Exam  GENERAL: alert and no distress  EYES: Eyes grossly normal to inspection, PERRL and conjunctivae and sclerae normal  HENT: ear canals and TM's normal, nose and mouth without ulcers or lesions  NECK: no adenopathy, no asymmetry, masses, or scars  RESP: lungs clear to auscultation - no rales, rhonchi or wheezes  CV: regular rate and rhythm, normal S1 S2, no S3 or S4, no murmur, click or rub, no peripheral edema  ABDOMEN: soft, nontender, " no hepatosplenomegaly, no masses and bowel sounds normal  MS: no gross musculoskeletal defects noted, no edema  SKIN: no suspicious lesions or rashes  NEURO: Normal strength and tone, mentation intact and speech normal  PSYCH: mentation appears normal, affect normal/bright        Signed Electronically by: Veronica Sampson MD

## 2024-07-24 NOTE — RESULT ENCOUNTER NOTE
-Your prostate cancer screening test (PSA) is still in a low range which is good.  -Your comprehensive metabolic panel (CMP, which includes electrolyte levels, blood sugar levels, and kidney and liver function tests) is normal.  -Your cholesterol panel looks great (!) with a much lower LDL (the bad cholesterol) and a still good HDL (the good cholesterol). Keep doing what you're doing! :)    Please let me know if you have any questions.  Best,   Hugh Sampson MD

## 2024-07-29 ENCOUNTER — OFFICE VISIT (OUTPATIENT)
Dept: FAMILY MEDICINE | Facility: CLINIC | Age: 54
End: 2024-07-29
Payer: COMMERCIAL

## 2024-07-29 VITALS
HEIGHT: 71 IN | RESPIRATION RATE: 16 BRPM | WEIGHT: 153.4 LBS | SYSTOLIC BLOOD PRESSURE: 114 MMHG | DIASTOLIC BLOOD PRESSURE: 74 MMHG | OXYGEN SATURATION: 98 % | BODY MASS INDEX: 21.48 KG/M2 | TEMPERATURE: 97.9 F

## 2024-07-29 DIAGNOSIS — T75.3XXA MOTION SICKNESS, INITIAL ENCOUNTER: ICD-10-CM

## 2024-07-29 DIAGNOSIS — Z71.84 TRAVEL ADVICE ENCOUNTER: Primary | ICD-10-CM

## 2024-07-29 PROCEDURE — 99402 PREV MED CNSL INDIV APPRX 30: CPT | Mod: 25 | Performed by: NURSE PRACTITIONER

## 2024-07-29 PROCEDURE — 90471 IMMUNIZATION ADMIN: CPT | Mod: GA | Performed by: NURSE PRACTITIONER

## 2024-07-29 PROCEDURE — 90691 TYPHOID VACCINE IM: CPT | Mod: GA | Performed by: NURSE PRACTITIONER

## 2024-07-29 RX ORDER — ATOVAQUONE AND PROGUANIL HYDROCHLORIDE 250; 100 MG/1; MG/1
1 TABLET, FILM COATED ORAL DAILY
Qty: 16 TABLET | Refills: 0 | Status: SHIPPED | OUTPATIENT
Start: 2024-10-05

## 2024-07-29 RX ORDER — AZITHROMYCIN 500 MG/1
500 TABLET, FILM COATED ORAL DAILY
Qty: 3 TABLET | Refills: 0 | Status: SHIPPED | OUTPATIENT
Start: 2024-07-29 | End: 2024-08-01

## 2024-07-29 RX ORDER — SCOLOPAMINE TRANSDERMAL SYSTEM 1 MG/1
1 PATCH, EXTENDED RELEASE TRANSDERMAL
Qty: 4 PATCH | Refills: 1 | Status: SHIPPED | OUTPATIENT
Start: 2024-07-29

## 2024-07-29 ASSESSMENT — PAIN SCALES - GENERAL: PAINLEVEL: NO PAIN (0)

## 2024-07-29 NOTE — PATIENT INSTRUCTIONS
Thank you for visiting the [facility] : [phone]  Today July 29, 2024 you received the    Typhoid - injectable. This vaccine is valid for two years.     Follow up vaccine appointments can be made as a NURSE ONLY visit at the Travel Clinic, (BE PREPARED TO WAIT, ) or at designated [place] [facility].    If you are receiving the Rabies vaccines series, it is important that you follow the exact schedule ordered.     Pre-travel     We recommend that you purchase Medical Evacuation Insurance prior to your departure.  Https://wwwnc.cdc.gov/travel/page/insurance    Cavour your travel plans with the  Department of State through STEP ( Smart Traveler Enrollment Program ) https://step.state.gov.  STEP is a free service to allow [place] citizens and nationals traveling and living abroad to enroll their trip with the nearest U[place]S. Embassy or Consulate.    Animal Exposure: Avoid all mammals even if they look healthy.  If there is a bite, scratch or even a lick, wash area immediately with soap and water for 15 minutes and seek medical care within 24 hours for evaluation of Rabies post exposure treatment.  Contact your Medical Evacuation Insurance.    Repiratory illness prevention strategies ( Covid and Influenza ) CDC recommendations:  Consider wearing a mask in crowded or poorly ventilated indoor areas, including on public transportation and in transportation hubs.  Hand washing: frequent, thorough handwashing with soap and water for 20 seconds. Use an alcohol-based hand  with at least 60% alcohol if soap and water are not readily available. Avoid touching face, nose, eyes, mouth unless you have done appropriate hand washing as above.  VACCINES: Staying up to date on COVID-19 vaccines significantly lowers the risk of getting very sick, being hospitalized, or dying from COVID-19. CDC recommends that everyone stay up to date on their COVID-19 vaccines, especially people with  weakened immune systems.    Travel Covid 19 Testing:  updated 12/06/2021  International travelers: Pre-travel:  See country specific Embassy websites or airline websites.    Post travel: CDC recommends getting tested 3-5 days after your trip     Post-travel illness:  Contact your provider or [facility] if you develop a fever, rash, cough, diarrhea or other symptoms for up to 1 year after travel.  Inform your healthcare provider when and where you traveled to.    Please call the [facility] with any questions [phone]  Or send your provider a 'My Chart' note.

## 2024-07-29 NOTE — PROGRESS NOTES
"Nurse Note ( Pre-Travel Consult)    Itinerary:  [place]    Departure Date: 10/6/24    Return Date: 10/12/2024    Length of Trip 1 week    Reason for Travel: Tourism         Urban or rural: both    Accommodations: Hotel        IMMUNIZATION HISTORY  Have you received any immunizations within the past 4 weeks?  No  Have you ever fainted from having your blood drawn or from an injection?  No  Have you ever had a fever reaction to vaccination?  No  Have you ever had any bad reaction or side effect from any vaccination?  No  Have you ever had hepatitis A or B vaccine?  Yes  Do you live (or work closely) with anyone who has AIDS, an AIDS-like condition, any other immune disorder or who is on chemotherapy for cancer?  No  Do you have a family history of immunodeficiency?  No  Have you received any injection of immune globulin or any blood products during the past 12 months?  No    Patient roomed by Trina Langford MA Apprentice     Subjective  Reji Lamb is a 54 year old male seen today alone for counsultation for international travel.   Patient will be departing in  2 month(s) and  traveling with co-worker(s).      Patient itinerary :  will be in the urban  region of [place] then to [facility]  which risk for Malaria, Dengue Fever, food borne illnesses, and motor vehicle accidents. exposure.      Patient's activities will include sightseeing and business.    Patient's country of birth is [place]    Special medical concerns: none  Pre-travel questionnaire was completed by patient and reviewed by provider.     Vitals: /74 (BP Location: Left arm, Patient Position: Sitting, Cuff Size: Adult Regular)   Temp 97.9  F (36.6  C) (Oral)   Resp 16   Ht 1.797 m (5' 10.75\")   Wt 69.6 kg (153 lb 6.4 oz)   SpO2 98%   BMI 21.55 kg/m    BMI= Body mass index is 21.55 kg/m .    EXAM:  General:  Well-nourished, well-developed in no acute distress.  Appears to be stated age, interacts appropriately and " expresses understanding of information given to patient.    Current Outpatient Medications   Medication Sig Dispense Refill    Cholecalciferol (VITAMIN D) 1000 UNITS capsule Take 1 capsule by mouth daily      emtricitabine-tenofovir (TRUVADA) 200-300 MG per tablet Take 1 tablet by mouth daily      ipratropium (ATROVENT) 0.06 % nasal spray Spray 2 sprays into both nostrils 4 times daily 15 mL 0    MELATONIN PO Take 3 mg by mouth nightly as needed      Omega-3 Fatty Acids (FISH OIL PO)       scopolamine (TRANSDERM) 1 MG/3DAYS 72 hr patch Place 1 patch onto the skin every 72 hours 3 patch 1    traZODone (DESYREL) 50 MG tablet TAKE 1/2 TO 1 TABLET BY MOUTH AT BEDTIME AS NEEDED 40 tablet 1    triamcinolone (KENALOG) 0.1 % external ointment Apply sparingly to affected area two times daily as needed 15 g 1    valACYclovir (VALTREX) 1000 mg tablet Take 2 tablets (2,000 mg) by mouth 2 times daily 16 tablet 1     Patient Active Problem List   Diagnosis    Family history of malignant neoplasm of prostate    HYPERLIPIDEMIA LDL GOAL <160    Herpes simplex virus (HSV) infection    Insomnia    Bilateral low back pain with sciatica, sciatica laterality unspecified    At risk for HIV due to homosexual contact    High risk sexual behavior, unspecified type     Allergies   Allergen Reactions    Seasonal Allergies     Amoxicillin Rash     Turned Red         Immunizations discussed include:   Covid 19: vaccine is not available  Hepatitis A:  Up to date  Hepatitis B: Up to date  Influenza: Up to date  Typhoid: Ordered/given today, risks, benefits and side effects reviewed  Rabies: Declined  reviewed managment of a animal bite or scratch (washing wound, seek medical care within 24 hours for post exposure prophylaxis )  Yellow Fever: Not indicated  Japanese Encephalitis: Not indicated  Meningococcus: Not indicated  Tetanus/Diphtheria: Up to date  Measles/Mumps/Rubella: Up to date  Cholera: Not needed  Polio: Not indicated  Pneumococcal:  Under age of 65  Varicella: Immune by disease history per patient report  Shingrix: Up to date  HPV:  Not indicated     TB: low risk     Altitude Exposure on this trip: no  Past tolerance to Altitude: na    ASSESSMENT/PLAN:  Reji was seen today for travel clinic.    Diagnoses and all orders for this visit:    Travel advice encounter  -     atovaquone-proguanil (MALARONE) 250-100 MG tablet; Take 1 tablet by mouth daily Start 2 days before exposure to Malaria and continue daily till  7 days after exposure.  -     azithromycin (ZITHROMAX) 500 MG tablet; Take 1 tablet (500 mg) by mouth daily for 3 doses Take 1 tablet a day for up to 3 days for severe diarrhea    Motion sickness, initial encounter  -     scopolamine (TRANSDERM) 1 MG/3DAYS 72 hr patch; Place 1 patch onto the skin every 72 hours    Other orders  -     TYPHOID VACCINE, IM      I have reviewed general recommendations for safe travel   including: food/water precautions, insect precautions, safer sex   practices given high prevalence of Zika, HIV and other STDs,   roadway safety. Educational materials and [employer] report provided.    Malaraia prophylaxis recommended: Malarone  Symptomatic treatment for traveler's diarrhea: azithromycin  Altitude illness prevention and treatment: NA  Evacuation insurance advised and resources were provided to patient.    Total visit time 30 minutes  with over 50% of time spent counseling patient and shared decision making as detailed above.    Marguerite Herrera CNP  Certificate in Travel Health

## 2024-08-27 ENCOUNTER — OFFICE VISIT (OUTPATIENT)
Dept: FAMILY MEDICINE | Facility: CLINIC | Age: 54
End: 2024-08-27
Payer: COMMERCIAL

## 2024-08-27 VITALS
DIASTOLIC BLOOD PRESSURE: 81 MMHG | SYSTOLIC BLOOD PRESSURE: 129 MMHG | BODY MASS INDEX: 21.55 KG/M2 | TEMPERATURE: 97.1 F | HEART RATE: 62 BPM | HEIGHT: 71 IN | WEIGHT: 153.9 LBS | OXYGEN SATURATION: 99 % | RESPIRATION RATE: 18 BRPM

## 2024-08-27 DIAGNOSIS — L21.9 SEBORRHEIC DERMATITIS: ICD-10-CM

## 2024-08-27 DIAGNOSIS — R21 RASH AND NONSPECIFIC SKIN ERUPTION: Primary | ICD-10-CM

## 2024-08-27 PROCEDURE — 99214 OFFICE O/P EST MOD 30 MIN: CPT | Performed by: FAMILY MEDICINE

## 2024-08-27 RX ORDER — TRIAMCINOLONE ACETONIDE 1 MG/G
OINTMENT TOPICAL
Qty: 15 G | Refills: 1 | Status: SHIPPED | OUTPATIENT
Start: 2024-08-27

## 2024-08-27 ASSESSMENT — PAIN SCALES - GENERAL: PAINLEVEL: NO PAIN (0)

## 2024-08-27 NOTE — PROGRESS NOTES
Assessment & Plan     Rash and nonspecific skin eruption  We discussed treatment for the rash on the head of his penis and I recommended he simply monitor this for now as it seems to be improving.  He could try topical clotrimazole if things become worse again.  He is also going to be scheduling a follow-up appointment with his dermatologist.  It is reassuring that [facility] labs [facility] including HIV and syphilis screening has been negative.    The rash in his buttocks appears consistent with a fungal type of rash and I recommended he continue the clotrimazole which he thinks is helping.  If symptoms do not improve he may consider trying triamcinolone.  The appearance of this rash has some consistency with psoriasis, but this has not been a problem for him in the past.  He is going to be scheduling a follow-up with his dermatologist and they should be able to evaluate this further, especially if symptoms or not improving.    Seborrheic dermatitis  He was given a refill of triamcinolone ointment that he uses as needed for seborrheic dermatitis.  - triamcinolone (KENALOG) 0.1 % external ointment; Apply sparingly to affected area two times daily as needed            Subjective   Reji is a 54 year old, presenting for the following health issues:  Derm Problem        8/27/2024     8:52 AM   Additional Questions   Roomed by Cira GARDINER     History of Present Illness       Reason for visit:  Possible fixed drug reaction  Symptom onset:  1-2 weeks ago  Symptoms include:  Red spot on glans penis  Symptom intensity:  Mild  Symptom progression:  Improving  Had these symptoms before:  No   He is taking medications regularly.     Over the past couple of weeks he has noticed a circular, pink flat rash on the head of his penis with well-demarcated borders.  He also has an erythematous rash in his buttocks, midline with well-demarcated borders.  Occasionally this rash is a little itchy.  He was seen at the  "[facility] and they thought he may have a fixed drug reaction.  He denies using any new medications and he denies having history of rashes like this.  He does have a history of seborrheic dermatitis and sees dermatology regularly.  He uses topical triamcinolone for this as needed.  The provider at the [facility] recommended topical clotrimazole for the buttock rash and simply monitoring the rash on the penis.  He feels like things are slowly improving.  They checked HIV and syphilis test which were negative.              Review of Systems  Constitutional, HEENT, cardiovascular, pulmonary, GI, , musculoskeletal, neuro, skin, endocrine and psych systems are negative, except as otherwise noted.      Objective    /81   Pulse 62   Temp 97.1  F (36.2  C) (Temporal)   Resp 18   Ht 1.797 m (5' 10.75\")   Wt 69.8 kg (153 lb 14.4 oz)   SpO2 99%   BMI 21.62 kg/m    Body mass index is 21.62 kg/m .  Physical Exam   GENERAL: alert and no distress  MS: no gross musculoskeletal defects noted, no edema  SKIN: There is a pink, circular rash with well-demarcated borders in the head of his penis.  There is a darker erythematous rash with well-demarcated borders in the mid buttock region.  No vesicles or papules seen.  NEURO: Normal strength and tone, mentation intact and speech normal  PSYCH: mentation appears normal, affect normal/bright            Signed Electronically by: Yuval Noble, DO    "

## 2024-09-23 ENCOUNTER — APPOINTMENT (OUTPATIENT)
Dept: URBAN - METROPOLITAN AREA CLINIC 256 | Age: 54
Setting detail: DERMATOLOGY
End: 2024-09-23

## 2024-10-01 PROBLEM — Z91.89 OTHER SPECIFIED PERSONAL RISK FACTORS, NOT ELSEWHERE CLASSIFIED: Status: ACTIVE | Noted: 2023-05-23

## 2025-03-18 DIAGNOSIS — G47.09 OTHER INSOMNIA: ICD-10-CM

## 2025-03-18 RX ORDER — TRAZODONE HYDROCHLORIDE 50 MG/1
TABLET ORAL
Qty: 40 TABLET | Refills: 1 | Status: SHIPPED | OUTPATIENT
Start: 2025-03-18

## 2025-03-31 DIAGNOSIS — B00.9 HERPES SIMPLEX VIRUS (HSV) INFECTION: ICD-10-CM

## 2025-03-31 RX ORDER — VALACYCLOVIR HYDROCHLORIDE 1 G/1
2000 TABLET, FILM COATED ORAL 2 TIMES DAILY
Qty: 16 TABLET | Refills: 1 | Status: SHIPPED | OUTPATIENT
Start: 2025-03-31

## 2025-06-11 ENCOUNTER — VIRTUAL VISIT (OUTPATIENT)
Dept: FAMILY MEDICINE | Facility: CLINIC | Age: 55
End: 2025-06-11
Payer: COMMERCIAL

## 2025-06-11 DIAGNOSIS — R03.0 ELEVATED BLOOD PRESSURE READING WITHOUT DIAGNOSIS OF HYPERTENSION: ICD-10-CM

## 2025-06-11 DIAGNOSIS — E86.0 DEHYDRATION: Primary | ICD-10-CM

## 2025-06-11 DIAGNOSIS — M62.81 GENERALIZED MUSCLE WEAKNESS: ICD-10-CM

## 2025-06-11 PROCEDURE — 98005 SYNCH AUDIO-VIDEO EST LOW 20: CPT | Performed by: FAMILY MEDICINE

## 2025-06-11 PROCEDURE — 1126F AMNT PAIN NOTED NONE PRSNT: CPT | Mod: 95 | Performed by: FAMILY MEDICINE

## 2025-06-11 NOTE — PROGRESS NOTES
"Reji is a 55 year old who is being evaluated via a billable video visit.    How would you like to obtain your AVS? MyChart  If the video visit is dropped, the invitation should be resent by: Send to e-mail at: [email or web address]  Will anyone else be joining your video visit? No      Assessment & Plan     Dehydration    Generalized muscle weakness    Elevated blood pressure reading without diagnosis of hypertension    Reji is a 55-year-old gentleman who is being seen for an emergency department follow-up visit. He was seen in the emergency department on 6/6/2025 in [place], [place] after coming off a 5-day cruise and feeling weak and \"out of it\" in the context of being dehydrated.  I reviewed with him the laboratory and imaging results that included a CT scan of the head which were all normal and unremarkable.  It is reassuring that he is feeling better by staying well-hydrated.  I do not feel like further workup for this is needed as long as he continues to feel well.  His blood pressure was elevated in the emergency department at 154/87.  He does not have a history of high blood pressure.  I recommended that he have his BP rechecked when he goes to [facility] later this month for his PrEP refill.  If the BP continues to run high he will let me know.    The longitudinal plan of care for the diagnosis(es)/condition(s) as documented were addressed during this visit. Due to the added complexity in care, I will continue to support Reji in the subsequent management and with ongoing continuity of care.    MED REC REQUIRED  Post Medication Reconciliation Status:  Discharge medications reconciled, continue medications without change        Subjective   Reji is a 55 year old, presenting for the following health issues:  ER F/U        6/11/2025    11:37 AM   Additional Questions   Roomed by Jaja AGUERO         6/11/2025    11:37 AM   Patient Reported Additional Medications   Patient reports taking the following new " "medications None     HPI      ED/UC Followup:    Facility:  [facility] [place], [place]   Date of visit: 6/06/2025  Reason for visit: Severe dehydration  Current Status: Improve       Reji is a 55-year-old gentleman who presents to the clinic for follow-up visit.  He was seen in the emergency department on 6/6/2025 in [place], [place] after coming off a 5-day cruise and feeling weak and \"out of it\".  He reports walking about 2 miles to the beach on the day the symptoms started.  When he was at the beach he had some alcohol and the symptoms started when he got back to the boat.  He reports feeling a little disassociated with some out of body experiences during this time.  This occurred a couple of more times and he decided to call paramedics and went to the emergency department.  In the emergency department he denied shortness of breath, chest pain, dizziness, headaches, palpitations, nausea, vomiting, diarrhea, abdominal pain or fevers.  He reported having a couple drinks on the cruise, but denied any drug use.  In the ED his blood pressure was elevated at 154/87.  The rest of his vitals were normal.  They checked labs including a troponin, D-dimer, urinalysis and urine drug screen which were all negative.  His CBC showed a slightly low white blood cell count of 4.2.  The CMP and magnesium results were normal.  They did a CT scan of the head which was normal.  He received IV fluids in the emergency department and symptoms eventually improved and he was discharged.  He reports doing well since that time.  He is trying to stay very well-hydrated which seems to be helping.    CT head without contrast 6/6/2025  Impression  No acute intracranial abnormality.    STUDY: CT HEAD WO CONTRAST      HISTORY:  Mental status change, unknown cause     TECHNIQUE: CT imaging was performed through the head withoutcontrast. Axial, sagittal, and coronal images are provided.     COMPARISON: None     FINDINGS:     No abnormal " brain parenchymal density. There is no mass-effect or midline shift. There is no evidence of acute intracranial hemorrhage. The size, shape and configuration of the ventricles and sulci are unremarkable.     The included orbits are unremarkable. Frothy contents at the right maxillary sinus. Clear mastoid air cells. No fracture is demonstrated.         Review of Systems  Constitutional, HEENT, cardiovascular, pulmonary, GI, , musculoskeletal, neuro, skin, endocrine and psych systems are negative, except as otherwise noted.      Objective    Vitals - Patient Reported  Systolic (Patient Reported):  (n/a)  Diastolic (Patient Reported):  (n/a)  Weight (Patient Reported):  (n/a)  Height (Patient Reported):  (n/a)  SpO2 (Patient Reported):  (n/a)  Temperature (Patient Reported):  (n/a)  Pulse (Patient Reported):  (n/a)  Pain Score: No Pain (0)      Vitals:  No vitals were obtained today due to virtual visit.    Physical Exam   GENERAL: alert and no distress  EYES: Eyes grossly normal to inspection.  No discharge or erythema, or obvious scleral/conjunctival abnormalities.  RESP: No audible wheeze, cough, or visible cyanosis.    SKIN: Visible skin clear. No significant rash, abnormal pigmentation or lesions.  NEURO: Cranial nerves grossly intact.  Mentation and speech appropriate for age.  PSYCH: Appropriate affect, tone, and pace of words          Video-Visit Details    Type of service:  Video Visit   Originating Location (pt. Location): Home    Distant Location (provider location):  On-site  Platform used for Video Visit: Leslie  Signed Electronically by: Yuval Noble DO

## 2025-07-09 ENCOUNTER — OFFICE VISIT (OUTPATIENT)
Dept: FAMILY MEDICINE | Facility: CLINIC | Age: 55
End: 2025-07-09
Payer: COMMERCIAL

## 2025-07-09 VITALS
DIASTOLIC BLOOD PRESSURE: 78 MMHG | SYSTOLIC BLOOD PRESSURE: 120 MMHG | HEIGHT: 70 IN | HEART RATE: 66 BPM | RESPIRATION RATE: 19 BRPM | TEMPERATURE: 97.9 F | WEIGHT: 156 LBS | BODY MASS INDEX: 22.33 KG/M2 | OXYGEN SATURATION: 97 %

## 2025-07-09 DIAGNOSIS — E86.0 DEHYDRATION: ICD-10-CM

## 2025-07-09 DIAGNOSIS — Z13.6 CARDIOVASCULAR SCREENING; LDL GOAL LESS THAN 130: ICD-10-CM

## 2025-07-09 DIAGNOSIS — R03.0 ELEVATED BP WITHOUT DIAGNOSIS OF HYPERTENSION: Primary | ICD-10-CM

## 2025-07-09 PROCEDURE — 99213 OFFICE O/P EST LOW 20 MIN: CPT | Performed by: FAMILY MEDICINE

## 2025-07-09 PROCEDURE — 3074F SYST BP LT 130 MM HG: CPT | Performed by: FAMILY MEDICINE

## 2025-07-09 PROCEDURE — 1126F AMNT PAIN NOTED NONE PRSNT: CPT | Performed by: FAMILY MEDICINE

## 2025-07-09 PROCEDURE — 3078F DIAST BP <80 MM HG: CPT | Performed by: FAMILY MEDICINE

## 2025-07-09 ASSESSMENT — PAIN SCALES - GENERAL: PAINLEVEL_OUTOF10: NO PAIN (0)

## 2025-07-09 NOTE — PROGRESS NOTES
Assessment & Plan       ICD-10-CM    1. Elevated BP without diagnosis of hypertension  R03.0       2. Dehydration  E86.0       3. CARDIOVASCULAR SCREENING; LDL GOAL LESS THAN 130  Z13.6          Follow-up after recent ER stay in [place].  Dehydrated after cruise, ambulance to ER.  Much improved x 3 bags of IVF.  Labs normal, but taken after first bag given in ambulance.  Felt much better with resuscitation.  Normal BMP, d-dimer, troponin, and head CT scan at ER.  BPs elevated in ER and appt since- SBPs in 140-150s, which prompted today's appt, but both BPs ~20 min apart looked great.  We'll continue to monitor BPs, he'll see if other places he can check.  Lipids reviewed from biometrics from work, which continue to look much better than previously, with LDL in 110s.          Subjective   Reji is a 55 year old, presenting for the following health issues:  Hypertension        7/9/2025     7:18 AM   Additional Questions   Roomed by sharlene ulloa   Accompanied by cale         7/9/2025     7:18 AM   Patient Reported Additional Medications   Patient reports taking the following new medications n/a     History of Present Illness       Reason for visit:  Check on high blood pressure  Symptoms include:  High blood pressure readings  Symptom intensity:  Moderate  Symptom progression:  Staying the same  Had these symptoms before:  No   He is taking medications regularly.      Recent ED visit in [place] after cruise-  Was dehydrated, walked on hot beach.  Never got caught up after the cruise.  Got back to [facility] after cruise, felt like he dissociated, couple hrs, felt like he was not on his body.  Called paramedics, ride in ambulance, got one bag of IVF in the ambulance, 2 in the ER.  Labs actually fine, but done after first bag.  Felt much better, and drank a lot.    BP were up in ER, and a few other appts lately.  At ER, BP was up at 154/87  Aliveness- 6/16- 143/83  Biometric work screening- 141/87    BP recheck 120/78 today- same as  initially.  Doesn't have a BP monitor to check otherwise.    Biometric labs done at work recently- bring in paper, but would be unable to abstract without any lab information/normal ranges.  141/87, recheck 137/92  Total 204  HDL 78    Trigs 50  Glucose (fasting) - 86       Patient Active Problem List   Diagnosis    Family history of malignant neoplasm of prostate    HYPERLIPIDEMIA LDL GOAL <160    Herpes simplex virus (HSV) infection    Insomnia    Bilateral low back pain with sciatica, sciatica laterality unspecified    Other specified personal risk factors, not elsewhere classified    High risk sexual behavior, unspecified type      Current Outpatient Medications   Medication Sig Dispense Refill    atovaquone-proguanil (MALARONE) 250-100 MG tablet Take 1 tablet by mouth daily Start 2 days before exposure to Malaria and continue daily till  7 days after exposure. 16 tablet 0    Cholecalciferol (VITAMIN D) 1000 UNITS capsule Take 1 capsule by mouth daily      emtricitabine-tenofovir (TRUVADA) 200-300 MG per tablet Take 1 tablet by mouth daily      MELATONIN PO Take 3 mg by mouth nightly as needed      Omega-3 Fatty Acids (FISH OIL PO)       scopolamine (TRANSDERM) 1 MG/3DAYS 72 hr patch Place 1 patch onto the skin every 72 hours 4 patch 1    traZODone (DESYREL) 50 MG tablet TAKE 1/2 TO 1 TABLET BY MOUTH AT BEDTIME AS NEEDED 40 tablet 1    triamcinolone (KENALOG) 0.1 % external ointment Apply sparingly to affected area two times daily as needed 15 g 1    valACYclovir (VALTREX) 1000 mg tablet TAKE 2 TABLETS(2000 MG) BY MOUTH TWICE DAILY 16 tablet 1         Allergies   Allergen Reactions    Seasonal Allergies     Amoxicillin Rash     Turned Red          Review of Systems  Constitutional, neuro, ENT, endocrine, pulmonary, cardiac, gastrointestinal, genitourinary, musculoskeletal, integument and psychiatric systems are negative, except as otherwise noted.      Objective    /78 (BP Location: Right arm,  "Patient Position: Sitting, Cuff Size: Adult Regular)   Pulse 66   Temp 97.9  F (36.6  C) (Temporal)   Resp 19   Ht 1.778 m (5' 10\")   Wt 70.8 kg (156 lb)   SpO2 97%   BMI 22.38 kg/m    Body mass index is 22.38 kg/m .  Physical Exam   GENERAL: alert and no distress  EYES: Eyes grossly normal to inspection, PERRL and conjunctivae and sclerae normal  HENT: ear canals and TM's normal, nose and mouth without ulcers or lesions  NECK: no adenopathy, no asymmetry, masses, or scars  RESP: lungs clear to auscultation - no rales, rhonchi or wheezes  CV: regular rate and rhythm, normal S1 S2, no S3 or S4, no murmur, click or rub, no peripheral edema  ABDOMEN: soft, nontender, no hepatosplenomegaly, no masses and bowel sounds normal  MS: no gross musculoskeletal defects noted, no edema  NEURO: Normal strength and tone, mentation intact and speech normal  PSYCH: mentation appears normal, affect normal/bright      Office Visit on 07/24/2024   Component Date Value Ref Range Status    Sodium 07/24/2024 136  135 - 145 mmol/L Final    Potassium 07/24/2024 4.6  3.4 - 5.3 mmol/L Final    Carbon Dioxide (CO2) 07/24/2024 27  22 - 29 mmol/L Final    Anion Gap 07/24/2024 9  7 - 15 mmol/L Final    Urea Nitrogen 07/24/2024 10.5  6.0 - 20.0 mg/dL Final    Creatinine 07/24/2024 0.81  0.67 - 1.17 mg/dL Final    GFR Estimate 07/24/2024 >90  >60 mL/min/1.73m2 Final    eGFR calculated using 2021 CKD-EPI equation.    Calcium 07/24/2024 9.3  8.8 - 10.4 mg/dL Final    Reference intervals for this test were updated on 7/16/2024 to reflect our healthy population more accurately. There may be differences in the flagging of prior results with similar values performed with this method. Those prior results can be interpreted in the context of the updated reference intervals.    Chloride 07/24/2024 100  98 - 107 mmol/L Final    Glucose 07/24/2024 78  70 - 99 mg/dL Final    Alkaline Phosphatase 07/24/2024 77  40 - 150 U/L Final    AST 07/24/2024 23  0 " - 45 U/L Final    ALT 07/24/2024 13  0 - 70 U/L Final    Protein Total 07/24/2024 7.3  6.4 - 8.3 g/dL Final    Albumin 07/24/2024 4.7  3.5 - 5.2 g/dL Final    Bilirubin Total 07/24/2024 0.4  <=1.2 mg/dL Final    Patient Fasting > 8hrs? 07/24/2024 Yes   Final    Cholesterol 07/24/2024 194  <200 mg/dL Final    Triglycerides 07/24/2024 67  <150 mg/dL Final    Direct Measure HDL 07/24/2024 69  >=40 mg/dL Final    LDL Cholesterol Calculated 07/24/2024 112 (H)  <=100 mg/dL Final    Non HDL Cholesterol 07/24/2024 125  <130 mg/dL Final    Patient Fasting > 8hrs? 07/24/2024 Yes   Final    Prostate Specific Antigen Screen 07/24/2024 1.15  0.00 - 3.50 ng/mL Final     No results found for any visits on 07/09/25.        Signed Electronically by: Veronica Sampson MD

## 2025-07-26 SDOH — HEALTH STABILITY: PHYSICAL HEALTH: ON AVERAGE, HOW MANY MINUTES DO YOU ENGAGE IN EXERCISE AT THIS LEVEL?: 40 MIN

## 2025-07-26 SDOH — HEALTH STABILITY: PHYSICAL HEALTH: ON AVERAGE, HOW MANY DAYS PER WEEK DO YOU ENGAGE IN MODERATE TO STRENUOUS EXERCISE (LIKE A BRISK WALK)?: 7 DAYS

## 2025-07-26 ASSESSMENT — SOCIAL DETERMINANTS OF HEALTH (SDOH): HOW OFTEN DO YOU GET TOGETHER WITH FRIENDS OR RELATIVES?: TWICE A WEEK

## 2025-07-26 NOTE — COMMUNITY RESOURCES LIST (ENGLISH)
Housing  [employer]   Program Provider: [employer]  Program Website : [email or web address]://www.housinglink.org/  Next Steps: Go to [email or web address]    Program Locations:   Address:  [address] [place] [address]   Distance:  3.7 mi   Office Phone Number: [phone]    Hours:   Monday: 8:00 AM - 5:00 PM   Tuesday: 8:00 AM - 5:00 PM   Wednesday: 8:00 AM - 5:00 PM   Thursday: 8:00 AM - 5:00 PM   Friday: 8:00 AM - 5:00 PM   Saturday: CLOSED   Sunday: CLOSED     Affordable Housing Online   Program Provider: [employer]  Program Website : [email or web address]  Next Steps: Go to [email or web address]    Program Locations:   Address:  [address] [place] [address]   Distance:  1019.28 mi     Hours:   Monday: 8:00 AM - 5:00 PM   Tuesday: 8:00 AM - 5:00 PM   Wednesday: 8:00 AM - 5:00 PM   Thursday: 8:00 AM - 5:00 PM   Friday: 8:00 AM - 5:00 PM   Saturday: CLOSED   Sunday: CLOSED

## 2025-07-29 ENCOUNTER — OFFICE VISIT (OUTPATIENT)
Dept: FAMILY MEDICINE | Facility: CLINIC | Age: 55
End: 2025-07-29
Attending: FAMILY MEDICINE
Payer: COMMERCIAL

## 2025-07-29 VITALS
TEMPERATURE: 97.2 F | OXYGEN SATURATION: 100 % | HEART RATE: 60 BPM | WEIGHT: 153.5 LBS | SYSTOLIC BLOOD PRESSURE: 131 MMHG | RESPIRATION RATE: 16 BRPM | BODY MASS INDEX: 21.49 KG/M2 | HEIGHT: 71 IN | DIASTOLIC BLOOD PRESSURE: 81 MMHG

## 2025-07-29 DIAGNOSIS — Z00.00 ROUTINE GENERAL MEDICAL EXAMINATION AT A HEALTH CARE FACILITY: Primary | ICD-10-CM

## 2025-07-29 DIAGNOSIS — B00.9 HERPES SIMPLEX VIRUS (HSV) INFECTION: ICD-10-CM

## 2025-07-29 DIAGNOSIS — R03.0 ELEVATED BP WITHOUT DIAGNOSIS OF HYPERTENSION: ICD-10-CM

## 2025-07-29 DIAGNOSIS — Z72.51 HIGH RISK SEXUAL BEHAVIOR, UNSPECIFIED TYPE: ICD-10-CM

## 2025-07-29 DIAGNOSIS — E78.5 HYPERLIPIDEMIA LDL GOAL <130: ICD-10-CM

## 2025-07-29 DIAGNOSIS — Z13.6 SCREENING FOR CARDIOVASCULAR CONDITION: ICD-10-CM

## 2025-07-29 DIAGNOSIS — G47.09 OTHER INSOMNIA: ICD-10-CM

## 2025-07-29 LAB
ALBUMIN SERPL BCG-MCNC: 4.7 G/DL (ref 3.5–5.2)
ALP SERPL-CCNC: 89 U/L (ref 40–150)
ALT SERPL W P-5'-P-CCNC: 18 U/L (ref 0–70)
ANION GAP SERPL CALCULATED.3IONS-SCNC: 11 MMOL/L (ref 7–15)
AST SERPL W P-5'-P-CCNC: 28 U/L (ref 0–45)
BILIRUB SERPL-MCNC: 0.6 MG/DL
BUN SERPL-MCNC: 11.2 MG/DL (ref 6–20)
CALCIUM SERPL-MCNC: 9.6 MG/DL (ref 8.8–10.4)
CHLORIDE SERPL-SCNC: 99 MMOL/L (ref 98–107)
CHOLEST SERPL-MCNC: 203 MG/DL
CREAT SERPL-MCNC: 0.82 MG/DL (ref 0.67–1.17)
EGFRCR SERPLBLD CKD-EPI 2021: >90 ML/MIN/1.73M2
FASTING STATUS PATIENT QL REPORTED: YES
FASTING STATUS PATIENT QL REPORTED: YES
GLUCOSE SERPL-MCNC: 91 MG/DL (ref 70–99)
HCO3 SERPL-SCNC: 26 MMOL/L (ref 22–29)
HDLC SERPL-MCNC: 83 MG/DL
LDLC SERPL CALC-MCNC: 107 MG/DL
NONHDLC SERPL-MCNC: 120 MG/DL
POTASSIUM SERPL-SCNC: 4.4 MMOL/L (ref 3.4–5.3)
PROT SERPL-MCNC: 7.4 G/DL (ref 6.4–8.3)
PSA SERPL DL<=0.01 NG/ML-MCNC: 1.33 NG/ML (ref 0–3.5)
SODIUM SERPL-SCNC: 136 MMOL/L (ref 135–145)
TRIGL SERPL-MCNC: 65 MG/DL

## 2025-07-29 PROCEDURE — 80053 COMPREHEN METABOLIC PANEL: CPT | Performed by: FAMILY MEDICINE

## 2025-07-29 PROCEDURE — 90677 PCV20 VACCINE IM: CPT | Performed by: FAMILY MEDICINE

## 2025-07-29 PROCEDURE — 99396 PREV VISIT EST AGE 40-64: CPT | Mod: 25 | Performed by: FAMILY MEDICINE

## 2025-07-29 PROCEDURE — 90471 IMMUNIZATION ADMIN: CPT | Performed by: FAMILY MEDICINE

## 2025-07-29 PROCEDURE — 1126F AMNT PAIN NOTED NONE PRSNT: CPT | Performed by: FAMILY MEDICINE

## 2025-07-29 PROCEDURE — 3075F SYST BP GE 130 - 139MM HG: CPT | Performed by: FAMILY MEDICINE

## 2025-07-29 PROCEDURE — 80061 LIPID PANEL: CPT | Performed by: FAMILY MEDICINE

## 2025-07-29 PROCEDURE — 3079F DIAST BP 80-89 MM HG: CPT | Performed by: FAMILY MEDICINE

## 2025-07-29 PROCEDURE — 36415 COLL VENOUS BLD VENIPUNCTURE: CPT | Performed by: FAMILY MEDICINE

## 2025-07-29 PROCEDURE — G0103 PSA SCREENING: HCPCS | Performed by: FAMILY MEDICINE

## 2025-07-29 RX ORDER — TRAZODONE HYDROCHLORIDE 50 MG/1
TABLET ORAL
Qty: 40 TABLET | Refills: 1 | Status: SHIPPED | OUTPATIENT
Start: 2025-07-29

## 2025-07-29 RX ORDER — VALACYCLOVIR HYDROCHLORIDE 1 G/1
2000 TABLET, FILM COATED ORAL 2 TIMES DAILY
Qty: 16 TABLET | Refills: 1 | Status: SHIPPED | OUTPATIENT
Start: 2025-07-29 | End: 2025-07-30

## 2025-07-29 ASSESSMENT — PAIN SCALES - GENERAL: PAINLEVEL_OUTOF10: NO PAIN (0)

## 2025-07-29 NOTE — PROGRESS NOTES
Preventive Care Visit  [facility]  Veronica Sampson MD, Family Medicine  Jul 29, 2025  {Provider  Link to [facility] :[id number]}    Assessment & Plan     Routine general medical examination at a health care facility  Reviewed chronic issues and medications/supplements.   Discussed healthy habits, eye/dental care, healthcare maintenance issues, including cancer screenings (colonoscopies, PSA), relevant immunizations, and cardiac risk factor screenings such as for cholesterol, HTN, and DM.  See orders for tests and screening needed.    Pneumonia-20 immunizations needed -Risks/benefits discussed, given today.   - Pneumococcal 20 Valent Conjugate (PCV20)  - PSA, screen; Future  - PSA, screen    Elevated BP without diagnosis of hypertension  BPs have looked pretty good the last two check here- had been elevated at other appts/ER in the past few months.  Does a great job with diet/exercise/salt.  He'll likely get a BP monitor for home, and follow-up if SBP >135-140.  - Comprehensive metabolic panel (BMP + Alb, Alk Phos, ALT, AST, Total. Bili, TP); Future  - Comprehensive metabolic panel (BMP + Alb, Alk Phos, ALT, AST, Total. Bili, TP)    Hyperlipidemia LDL goal <130  Screening for cardiovascular condition  LDL has been impressively improved with pt's diet/exercise efforts- has never been on meds. 10yr ASCVD risk ~5% now w/ LDL in 110s.  Will recheck fasting lipids today and he'll continue great efforts.  - Comprehensive metabolic panel (BMP + Alb, Alk Phos, ALT, AST, Total. Bili, TP); Future  - Lipid panel reflex to direct LDL Fasting; Future  - Comprehensive metabolic panel (BMP + Alb, Alk Phos, ALT, AST, Total. Bili, TP)  - Lipid panel reflex to direct LDL Fasting    Other insomnia  Doing well with trazodone 25mg- uses occasionally, usually if he wakes ~2-3am and doesn't feel like he'll be able to get back to sleep.  - traZODone (DESYREL) 50 MG tablet; TAKE 1/2 TO 1 TABLET BY MOUTH AT BEDTIME AS  NEEDED    Herpes simplex virus (HSV) infection  Rare use/need for valtrex, works well, no se's.  Refills sent.  - valACYclovir (VALTREX) 1000 mg tablet; Take 2 tablets (2,000 mg) by mouth 2 times daily for 1 day.    Patient has been advised of split billing requirements and indicates understanding: Yes    Counseling  Appropriate preventive services were addressed with this patient via screening, questionnaire, or discussion as appropriate for fall prevention, nutrition, physical activity, Tobacco-use cessation, social engagement, weight loss and cognition.  Checklist reviewing preventive services available has been given to the patient.  Reviewed patient's diet, addressing concerns and/or questions.   He is at risk for psychosocial distress and has been provided with information to reduce risk.   Reviewed preventive health counseling, as reflected in patient instructions    Follow-up    Follow-up Visit   Expected date:  Jul 29, 2026 (Approximate)      Follow Up Appointment Details:     Follow-up with whom?: Me    Follow-Up for what?: Adult Preventive    Any Additional Chronic Condition Management?:  Comment - Blood pressure   monitoring, insomnia    How?: In Person                 Subjective   Reji is a 55 year old, presenting for the following:  Physical     HPI       BP elevations-  High BPs at various places the past few months, but good at follow-up appt recently.  Today was a rough morning, so not surprised it's a bit higher today.  Yesterday, didn't get to gym, didn't sleep well.  Plans to get home monitor just to be able to check.  Has never been on BP meds.    Lipids- LDL from max of 172 in '12. Then down to 109, 12/13.  Ramped up to max again of 156 in 12/21, then back down to 139 and 112 last summer.  Eating/exercising is great.  Doesn't feel deprived.  Has never been on lipid lowering meds.    The 10-year ASCVD risk score (Jose DK, et al., 2019) is: 5.2%    Values used to calculate the score:      Age:  55 years      Sex: Male      Is Non- : No      Diabetic: No      Tobacco smoker: No      Systolic Blood Pressure: 131 mmHg      Is BP treated: No      HDL Cholesterol: 69 mg/dL      Total Cholesterol: 194 mg/dL     **** check nabla  Other insomnia  - Insomnia managed with trazodone, taken 2-3 times a week    Herpes simplex virus (HSV) infection and Dermatologic Condition  - Uses valacyclovir as needed for herpes simplex outbreaks, with history of daily suppressive therapy in the past but now only episodic use  - Reports prodromal symptoms (itching) at the same site prior to outbreaks, but can go months between episodes  - Uses triamcinolone cream for seborrheic dermatitis, prescribed August of previous year  - Reports good control of symptoms with intermittent use    Travel and Infectious Disease Prophylaxis and HIV Pre-Exposure Prophylaxis (PrEP)  - Has used malarone in the past for malaria prophylaxis related to travel  - No current use reported  - Currently taking Truvada for PrEP  - Undergoes regular laboratory monitoring and follow-up as part of PrEP protocol    Hepatitis B Immunization  - Received three hepatitis B vaccinations: 1995, November 1995, and December 18, 2022  - Reports additional doses due to lack of antibody response and 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  - Underwent antibody titer testing, with at least one instance of non-reactive result, leading to additional vaccination  - Unsure if final antibody response was sufficient; records not fully available    Misc  - No current need for scopolamine patch; not taking at present  - Reports annual laboratory screening through work, but results not integrated into current medical record      {MA/LPN/RN Pre-Provider Visit Orders- hCG/UA/Strep (Optional):688031}  {SUPERLIST (Optional):912472}  {additonal problems for provider to add (Optional):494994}  Advance Care Planning  {The storyboard will display whether  the patient has ACP docs on file. Hover over the Code section in the storyboard to access the ACP documents. :981378}  Discussed advance care planning with patient; informed AVS has link to Honoring Choices.        7/26/2025   General Health   How would you rate your overall physical health? Excellent   Feel stress (tense, anxious, or unable to sleep) To some extent   (!) STRESS CONCERN      7/26/2025   Nutrition   Three or more servings of calcium each day? Yes   Diet: Regular (no restrictions)   How many servings of fruit and vegetables per day? 4 or more   How many sweetened beverages each day? 0-1         7/26/2025   Exercise   Days per week of moderate/strenous exercise 7 days   Average minutes spent exercising at this level 40 min         7/26/2025   Social Factors   Frequency of gathering with friends or relatives Twice a week   Worry food won't last until get money to buy more No   Food not last or not have enough money for food? No   Do you have housing? (Housing is defined as stable permanent housing and does not include staying outside in a car, in a tent, in an abandoned building, in an overnight shelter, or couch-surfing.) No   Are you worried about losing your housing? No   Lack of transportation? No   Unable to get utilities (heat,electricity)? No   Want help with housing or utility concern? No   (!) HOUSING CONCERN PRESENT      7/26/2025   Fall Risk   Fallen 2 or more times in the past year? No   Trouble with walking or balance? No          7/26/2025   Dental   Dentist two times every year? Yes         Today's PHQ-2 Score:       7/28/2025     9:04 AM   PHQ-2 ( 1999 [employer])   Q1: Little interest or pleasure in doing things 0   Q2: Feeling down, depressed or hopeless 0   PHQ-2 Score 0    Q1: Little interest or pleasure in doing things Not at all   Q2: Feeling down, depressed or hopeless Not at all   PHQ-2 Score 0       Patient-reported           7/26/2025   Substance Use   Alcohol more than 3/day or  "more than 7/wk No   Do you use any other substances recreationally? No     Social History     Tobacco Use    Smoking status: Never    Smokeless tobacco: Never   Vaping Use    Vaping status: Never Used   Substance Use Topics    Alcohol use: Yes     Comment: ~6 drinks/wk    Drug use: No     {Provider  If there are gaps in the social history shown above, please follow the link to update and then refresh the note Link to Social and Substance History :757324}      7/26/2025   STI Screening   New sexual partner(s) since last STI/HIV test? No     Last PSA:   PSA   Date Value Ref Range Status   10/28/2020 1.16 0 - 4 ug/L Final     Comment:     Assay Method:  Chemiluminescence using Siemens Vista analyzer     Prostate Specific Antigen Screen   Date Value Ref Range Status   07/24/2024 1.15 0.00 - 3.50 ng/mL Final   12/15/2021 1.14 0.00 - 4.00 ug/L Final     ASCVD Risk   The 10-year ASCVD risk score (Jose BOWMAN, et al., 2019) is: 4.5%    Values used to calculate the score:      Age: 55 years      Sex: Male      Is Non- : No      Diabetic: No      Tobacco smoker: No      Systolic Blood Pressure: 120 mmHg      Is BP treated: No      HDL Cholesterol: 69 mg/dL      Total Cholesterol: 194 mg/dL    {Link to Fracture Risk Assessment Tool (Optional):077189}    {Provider  REQUIRED FOR AWV Use the storyboard to review patient history, after sections have been marked as reviewed, refresh note to capture documentation:167899}   Reviewed and updated as needed this visit by Provider                    {HISTORY OPTIONS (Optional):831254}    {ROS Picklists (Optional):766356}     Objective    Exam  There were no vitals taken for this visit.   Estimated body mass index is 22.38 kg/m  as calculated from the following:    Height as of 7/9/25: 1.778 m (5' 10\").    Weight as of 7/9/25: 70.8 kg (156 lb).    Physical Exam  {Exam Choices (Optional):066042}        Signed Electronically by: Veronica Sampson, " MD  {Email feedback regarding this note to [email or web address]   :784796}

## 2025-07-29 NOTE — PATIENT INSTRUCTIONS
"Patient Education   Preventive Care Advice   This is general advice we often give to help people stay healthy. Your care team may have specific advice just for you. Please talk to your care team about your own preventive care needs.  Lifestyle  Exercise at least 150 minutes each week (30 minutes a day, 5 days a week).  Do muscle strengthening activities 2 days a week. These help control your weight and prevent disease.  No smoking.  Wear sunscreen to prevent skin cancer.  Take time with family and friends.  Have your home tested for radon every 2 to 5 years. Radon is a colorless, odorless gas that can harm your lungs. To learn more, go to www.health.[facility].us and search for \"Radon in Homes.\"  Keep guns unloaded and locked up in a safe place like a safe or gun vault, or, use a gun lock and hide the keys. Always lock away bullets separately. To learn more, visit [employer].gov and search for \"safe gun storage.\"  Nutrition  Eat 5 or more servings of fruits and vegetables each day.  Try wheat bread, brown rice and whole grain pasta (instead of white bread, rice, and pasta).  Get enough calcium and vitamin D. Check the label on foods and aim for 100% of the RDA (recommended daily allowance).  Regular exams  Have a dental exam and cleaning every 6 months.  Older adults: Ask your care team how often to have memory testing.  See your health care team every year to talk about:  Any changes in your health.  Any medicines your care team has prescribed.  Preventive care, family planning, and ways to prevent chronic diseases.  Shots (vaccines)   HPV shots (up to age 26), if you've never had them before.  Hepatitis B shots (up to age 59), if you've never had them before.  COVID-19 shot: Get this shot when it's due.  Flu shot: Get a flu shot every year.  Tetanus shot: Get a tetanus shot every 10 years.  Pneumococcal, hepatitis A, and RSV shots: Ask your care team if you need these based on your risk.  Shingles shot (for age 50 and " up).  General health tests  Diabetes screening:  Starting at age 35, Get screened for diabetes at least every 3 years.  If you are younger than age 35, ask your care team if you should be screened for diabetes.  Cholesterol test: At age 39, start having a cholesterol test every 5 years, or more often if advised.  Bone density scan (DEXA): At age 50, ask your care team if you should have this scan for osteoporosis (brittle bones).  Hepatitis C: Get tested at least once in your life.  Abdominal aortic aneurysm screening: Talk to your doctor about having this screening if you:  Have ever smoked; and  Are biologically male; and  Are between the ages of 65 and 75.  STIs (sexually transmitted infections)  Before age 24: Ask your care team if you should be screened for STIs.  After age 24: Get screened for STIs if you're at risk. You are at risk for STIs (including HIV) if:  You are sexually active with more than one person.  You don't use condoms every time.  You or a partner was diagnosed with a sexually transmitted infection.  If you are at risk for HIV, ask about PrEP medicine to prevent HIV.  Get tested for HIV at least once in your life, whether you are at risk for HIV or not.  Cancer screening tests  Cervical cancer screening: If you have a cervix, begin getting regular cervical cancer screening tests at age 21. Most people who have regular screenings with normal results can stop after age 65. Talk about this with your provider.  Breast cancer scan (mammogram): If you've ever had breasts, begin having regular mammograms starting at age 40. This is a scan to check for breast cancer.  Colon cancer screening: It is important to start screening for colon cancer at age 45.  Have a colonoscopy test every 10 years (or more often if you're at risk) Or, ask your provider about stool tests like a FIT test every year or Cologuard test every 3 years.  To learn more about your testing options, visit:  www.[employer]/118435.pdf.  For help making a decision, visit: tommy.ricardo/lz64063.  Prostate cancer screening test: If you have a prostate and are age 55 to 69, ask your provider if you would benefit from a yearly prostate cancer screening test.  Lung cancer screening: If you are a current or former smoker age 50 to 80, ask your care team if ongoing lung cancer screenings are right for you.    For informational purposes only. Not to replace the advice of your health care provider. Copyright   2023 [facility] [employer]. All rights reserved. Clinically reviewed by the [facility]itions [facility]. ServiceNow 249558 - REV 6/25.  Learning About Stress  What is stress?     Stress is your body's response to a hard situation. Your body can have a physical, emotional, or mental response. Stress is a fact of life for most people, and it affects everyone differently. What causes stress for you may not be stressful for someone else.  A lot of things can cause stress. You may feel stress when you go on a job interview, take a test, or run a race. This kind of short-term stress is normal and even useful. It can help you if you need to work hard or react quickly. For example, stress can help you finish an important job on time.  Long-term stress is caused by ongoing stressful situations or events. Examples of long-term stress include long-term health problems, ongoing problems at work, or conflicts in your family. Long-term stress can harm your health.  How does stress affect your health?  When you are stressed, your body responds as though you are in danger. It makes hormones that speed up your heart, make you breathe faster, and give you a burst of energy. This is called the fight-or-flight stress response. If the stress is over quickly, your body goes back to normal and no harm is done.  But if stress happens too often or lasts too long, it can have bad effects. Long-term stress can make you more likely to get sick,  and it can make symptoms of some diseases worse. If you tense up when you are stressed, you may develop neck, shoulder, or low back pain. Stress is linked to high blood pressure and heart disease.  Stress also harms your emotional health. It can make you leo, tense, or depressed. Your relationships may suffer, and you may not do well at work or school.  What can you do to manage stress?  You can try these things to help manage stress:   Do something active. Exercise or activity can help reduce stress. Walking is a great way to get started. Even everyday activities such as housecleaning or yard work can help.  Try yoga or hellen chi. These techniques combine exercise and meditation. You may need some training at first to learn them.  Do something you enjoy. For example, listen to music or go to a movie. Practice your hobby or do volunteer work.  Meditate. This can help you relax, because you are not worrying about what happened before or what may happen in the future.  Do guided imagery. Imagine yourself in any setting that helps you feel calm. You can use online videos, books, or a teacher to guide you.  Do breathing exercises. For example:  From a standing position, bend forward from the waist with your knees slightly bent. Let your arms dangle close to the floor.  Breathe in slowly and deeply as you return to a standing position. Roll up slowly and lift your head last.  Hold your breath for just a few seconds in the standing position.  Breathe out slowly and bend forward from the waist.  Let your feelings out. Talk, laugh, cry, and express anger when you need to. Talking with supportive friends or family, a counselor, or a [employer] leader about your feelings is a healthy way to relieve stress. Avoid discussing your feelings with people who make you feel worse.  Write. It may help to write about things that are bothering you. This helps you find out how much stress you feel and what is causing it. When you know this,  "you can find better ways to cope.  What can you do to prevent stress?  You might try some of these things to help prevent stress:  Manage your time. This helps you find time to do the things you want and need to do.  Get enough sleep. Your body recovers from the stresses of the day while you are sleeping.  Get support. Your family, friends, and community can make a difference in how you experience stress.  Limit your news feed. Avoid or limit time on social media or news that may make you feel stressed.  Do something active. Exercise or activity can help reduce stress. Walking is a great way to get started.  Where can you learn more?  Go to https://www.[employer].net/patiented  Enter N032 in the search box to learn more about \"Learning About Stress.\"  Current as of: October 24, 2024  Content Version: 14.5    7362-7656 [employer].   Care instructions adapted under license by your healthcare professional. If you have questions about a medical condition or this instruction, always ask your healthcare professional. [employer] disclaims any warranty or liability for your use of this information.       "

## 2025-07-29 NOTE — NURSING NOTE
Prior to immunization administration, verified patients identity using patient s name and date of birth. Please see Immunization Activity for additional information.     Screening Questionnaire for Adult Immunization    Are you sick today?   No   Do you have allergies to medications, food, a vaccine component or latex?   No   Have you ever had a serious reaction after receiving a vaccination?   No   Do you have a long-term health problem with heart, lung, kidney, or metabolic disease (e.g., diabetes), asthma, a blood disorder, no spleen, complement component deficiency, a cochlear implant, or a spinal fluid leak?  Are you on long-term aspirin therapy?   No   Do you have cancer, leukemia, HIV/AIDS, or any other immune system problem?   No   Do you have a parent, brother, or sister with an immune system problem?   No   In the past 3 months, have you taken medications that affect  your immune system, such as prednisone, other steroids, or anticancer drugs; drugs for the treatment of rheumatoid arthritis, Crohn s disease, or psoriasis; or have you had radiation treatments?   No   Have you had a seizure, or a brain or other nervous system problem?   No   During the past year, have you received a transfusion of blood or blood    products, or been given immune (gamma) globulin or antiviral drug?   No   For women: Are you pregnant or is there a chance you could become       pregnant during the next month?   No   Have you received any vaccinations in the past 4 weeks?   No     Immunization questionnaire answers were all negative.        Patient instructed to remain in clinic for 15 minutes afterwards, and to report any adverse reactions.     Screening performed by Cas Rizzo MA on 7/29/2025 at 8:41 AM.

## (undated) RX ORDER — FENTANYL CITRATE 50 UG/ML
INJECTION, SOLUTION INTRAMUSCULAR; INTRAVENOUS
Status: DISPENSED
Start: 2018-07-19